# Patient Record
Sex: MALE | Race: WHITE | Employment: OTHER | URBAN - NONMETROPOLITAN AREA
[De-identification: names, ages, dates, MRNs, and addresses within clinical notes are randomized per-mention and may not be internally consistent; named-entity substitution may affect disease eponyms.]

---

## 2022-02-21 ENCOUNTER — APPOINTMENT (OUTPATIENT)
Dept: CT IMAGING | Age: 62
DRG: 300 | End: 2022-02-21
Payer: COMMERCIAL

## 2022-02-21 ENCOUNTER — TELEPHONE (OUTPATIENT)
Dept: VASCULAR SURGERY | Age: 62
End: 2022-02-21

## 2022-02-21 ENCOUNTER — HOSPITAL ENCOUNTER (INPATIENT)
Age: 62
LOS: 3 days | Discharge: ANOTHER HOSPITAL | DRG: 300 | End: 2022-02-24
Attending: EMERGENCY MEDICINE | Admitting: HOSPITALIST
Payer: COMMERCIAL

## 2022-02-21 DIAGNOSIS — I71.40 ABDOMINAL AORTIC ANEURYSM (AAA) WITHOUT RUPTURE: Primary | ICD-10-CM

## 2022-02-21 PROBLEM — I72.9 ANEURYSM (HCC): Status: ACTIVE | Noted: 2022-02-21

## 2022-02-21 LAB
ALBUMIN SERPL-MCNC: 2.6 G/DL (ref 3.5–5.2)
ALP BLD-CCNC: 151 U/L (ref 40–130)
ALT SERPL-CCNC: 19 U/L (ref 5–41)
ANION GAP SERPL CALCULATED.3IONS-SCNC: 12 MMOL/L (ref 7–19)
APTT: 40.8 SEC (ref 26–36.2)
AST SERPL-CCNC: 40 U/L (ref 5–40)
BASOPHILS ABSOLUTE: 0.1 K/UL (ref 0–0.2)
BASOPHILS RELATIVE PERCENT: 1 % (ref 0–1)
BILIRUB SERPL-MCNC: 1.2 MG/DL (ref 0.2–1.2)
BILIRUBIN URINE: NEGATIVE
BLOOD, URINE: NEGATIVE
BUN BLDV-MCNC: 14 MG/DL (ref 8–23)
CALCIUM SERPL-MCNC: 9.4 MG/DL (ref 8.8–10.2)
CHLORIDE BLD-SCNC: 105 MMOL/L (ref 98–111)
CLARITY: CLEAR
CO2: 22 MMOL/L (ref 22–29)
COLOR: ABNORMAL
CREAT SERPL-MCNC: 0.7 MG/DL (ref 0.5–1.2)
EOSINOPHILS ABSOLUTE: 0.1 K/UL (ref 0–0.6)
EOSINOPHILS RELATIVE PERCENT: 2.3 % (ref 0–5)
FERRITIN: 1756 NG/ML (ref 30–400)
FOLATE: 10.1 NG/ML (ref 4.5–32.2)
GFR AFRICAN AMERICAN: >59
GFR NON-AFRICAN AMERICAN: >60
GLUCOSE BLD-MCNC: 151 MG/DL (ref 74–109)
GLUCOSE URINE: NEGATIVE MG/DL
HCT VFR BLD CALC: 38.6 % (ref 42–52)
HEMOGLOBIN: 13 G/DL (ref 14–18)
IMMATURE GRANULOCYTES #: 0 K/UL
INR BLD: 1.38 (ref 0.88–1.18)
IRON SATURATION: 85 % (ref 14–50)
IRON: 114 UG/DL (ref 59–158)
KETONES, URINE: NEGATIVE MG/DL
LEUKOCYTE ESTERASE, URINE: NEGATIVE
LIPASE: 89 U/L (ref 13–60)
LYMPHOCYTES ABSOLUTE: 2 K/UL (ref 1.1–4.5)
LYMPHOCYTES RELATIVE PERCENT: 34.1 % (ref 20–40)
MAGNESIUM: 1.7 MG/DL (ref 1.6–2.4)
MCH RBC QN AUTO: 34.1 PG (ref 27–31)
MCHC RBC AUTO-ENTMCNC: 33.7 G/DL (ref 33–37)
MCV RBC AUTO: 101.3 FL (ref 80–94)
MONOCYTES ABSOLUTE: 0.9 K/UL (ref 0–0.9)
MONOCYTES RELATIVE PERCENT: 14.9 % (ref 0–10)
NEUTROPHILS ABSOLUTE: 2.8 K/UL (ref 1.5–7.5)
NEUTROPHILS RELATIVE PERCENT: 47.5 % (ref 50–65)
NITRITE, URINE: NEGATIVE
PDW BLD-RTO: 13.7 % (ref 11.5–14.5)
PH UA: 6 (ref 5–8)
PLATELET # BLD: 130 K/UL (ref 130–400)
PMV BLD AUTO: 10.7 FL (ref 9.4–12.4)
POTASSIUM SERPL-SCNC: 4.4 MMOL/L (ref 3.5–5)
PROTEIN UA: NEGATIVE MG/DL
PROTHROMBIN TIME: 17 SEC (ref 12–14.6)
RBC # BLD: 3.81 M/UL (ref 4.7–6.1)
SARS-COV-2, NAAT: NOT DETECTED
SODIUM BLD-SCNC: 139 MMOL/L (ref 136–145)
SPECIFIC GRAVITY UA: 1.02 (ref 1–1.03)
TOTAL IRON BINDING CAPACITY: 134 UG/DL (ref 250–400)
TOTAL PROTEIN: 8 G/DL (ref 6.6–8.7)
TSH SERPL DL<=0.05 MIU/L-ACNC: 2.29 UIU/ML (ref 0.27–4.2)
UROBILINOGEN, URINE: 1 E.U./DL
VITAMIN B-12: 978 PG/ML (ref 211–946)
VITAMIN D 25-HYDROXY: 32.5 NG/ML
WBC # BLD: 6 K/UL (ref 4.8–10.8)

## 2022-02-21 PROCEDURE — 6360000004 HC RX CONTRAST MEDICATION: Performed by: EMERGENCY MEDICINE

## 2022-02-21 PROCEDURE — 82746 ASSAY OF FOLIC ACID SERUM: CPT

## 2022-02-21 PROCEDURE — 82306 VITAMIN D 25 HYDROXY: CPT

## 2022-02-21 PROCEDURE — 87635 SARS-COV-2 COVID-19 AMP PRB: CPT

## 2022-02-21 PROCEDURE — 85730 THROMBOPLASTIN TIME PARTIAL: CPT

## 2022-02-21 PROCEDURE — 83540 ASSAY OF IRON: CPT

## 2022-02-21 PROCEDURE — 96374 THER/PROPH/DIAG INJ IV PUSH: CPT

## 2022-02-21 PROCEDURE — 84443 ASSAY THYROID STIM HORMONE: CPT

## 2022-02-21 PROCEDURE — 99284 EMERGENCY DEPT VISIT MOD MDM: CPT

## 2022-02-21 PROCEDURE — 6360000002 HC RX W HCPCS: Performed by: EMERGENCY MEDICINE

## 2022-02-21 PROCEDURE — 82607 VITAMIN B-12: CPT

## 2022-02-21 PROCEDURE — 6370000000 HC RX 637 (ALT 250 FOR IP): Performed by: HOSPITALIST

## 2022-02-21 PROCEDURE — 1210000000 HC MED SURG R&B

## 2022-02-21 PROCEDURE — 75635 CT ANGIO ABDOMINAL ARTERIES: CPT

## 2022-02-21 PROCEDURE — 83550 IRON BINDING TEST: CPT

## 2022-02-21 PROCEDURE — 80053 COMPREHEN METABOLIC PANEL: CPT

## 2022-02-21 PROCEDURE — 82728 ASSAY OF FERRITIN: CPT

## 2022-02-21 PROCEDURE — 36415 COLL VENOUS BLD VENIPUNCTURE: CPT

## 2022-02-21 PROCEDURE — 6360000002 HC RX W HCPCS: Performed by: HOSPITALIST

## 2022-02-21 PROCEDURE — 83735 ASSAY OF MAGNESIUM: CPT

## 2022-02-21 PROCEDURE — 2580000003 HC RX 258

## 2022-02-21 PROCEDURE — 85610 PROTHROMBIN TIME: CPT

## 2022-02-21 PROCEDURE — 96375 TX/PRO/DX INJ NEW DRUG ADDON: CPT

## 2022-02-21 PROCEDURE — 83690 ASSAY OF LIPASE: CPT

## 2022-02-21 PROCEDURE — 81003 URINALYSIS AUTO W/O SCOPE: CPT

## 2022-02-21 PROCEDURE — 85025 COMPLETE CBC W/AUTO DIFF WBC: CPT

## 2022-02-21 RX ORDER — MORPHINE SULFATE 4 MG/ML
4 INJECTION, SOLUTION INTRAMUSCULAR; INTRAVENOUS
Status: DISCONTINUED | OUTPATIENT
Start: 2022-02-21 | End: 2022-02-23

## 2022-02-21 RX ORDER — ACETAMINOPHEN 650 MG/1
650 SUPPOSITORY RECTAL EVERY 6 HOURS PRN
Status: DISCONTINUED | OUTPATIENT
Start: 2022-02-21 | End: 2022-02-25 | Stop reason: HOSPADM

## 2022-02-21 RX ORDER — LORAZEPAM 2 MG/ML
1 INJECTION INTRAMUSCULAR
Status: DISCONTINUED | OUTPATIENT
Start: 2022-02-21 | End: 2022-02-23

## 2022-02-21 RX ORDER — LORAZEPAM 1 MG/1
1 TABLET ORAL
Status: DISCONTINUED | OUTPATIENT
Start: 2022-02-21 | End: 2022-02-23

## 2022-02-21 RX ORDER — SODIUM CHLORIDE 0.9 % (FLUSH) 0.9 %
5-40 SYRINGE (ML) INJECTION PRN
Status: DISCONTINUED | OUTPATIENT
Start: 2022-02-21 | End: 2022-02-21 | Stop reason: SDUPTHER

## 2022-02-21 RX ORDER — SPIRONOLACTONE 100 MG/1
100 TABLET, FILM COATED ORAL 2 TIMES DAILY
Status: ON HOLD | COMMUNITY
End: 2022-02-23 | Stop reason: SDUPTHER

## 2022-02-21 RX ORDER — ONDANSETRON 4 MG/1
4 TABLET, ORALLY DISINTEGRATING ORAL EVERY 8 HOURS PRN
Status: DISCONTINUED | OUTPATIENT
Start: 2022-02-21 | End: 2022-02-25 | Stop reason: HOSPADM

## 2022-02-21 RX ORDER — SODIUM CHLORIDE 0.9 % (FLUSH) 0.9 %
5-40 SYRINGE (ML) INJECTION EVERY 12 HOURS SCHEDULED
Status: DISCONTINUED | OUTPATIENT
Start: 2022-02-21 | End: 2022-02-21 | Stop reason: SDUPTHER

## 2022-02-21 RX ORDER — POLYETHYLENE GLYCOL 3350 17 G/17G
17 POWDER, FOR SOLUTION ORAL DAILY PRN
Status: DISCONTINUED | OUTPATIENT
Start: 2022-02-21 | End: 2022-02-25 | Stop reason: HOSPADM

## 2022-02-21 RX ORDER — SODIUM CHLORIDE 0.9 % (FLUSH) 0.9 %
5-40 SYRINGE (ML) INJECTION EVERY 12 HOURS SCHEDULED
Status: DISCONTINUED | OUTPATIENT
Start: 2022-02-21 | End: 2022-02-25 | Stop reason: HOSPADM

## 2022-02-21 RX ORDER — SODIUM CHLORIDE 0.9 % (FLUSH) 0.9 %
5-40 SYRINGE (ML) INJECTION PRN
Status: DISCONTINUED | OUTPATIENT
Start: 2022-02-21 | End: 2022-02-25 | Stop reason: HOSPADM

## 2022-02-21 RX ORDER — ONDANSETRON 2 MG/ML
4 INJECTION INTRAMUSCULAR; INTRAVENOUS EVERY 6 HOURS PRN
Status: DISCONTINUED | OUTPATIENT
Start: 2022-02-21 | End: 2022-02-25 | Stop reason: HOSPADM

## 2022-02-21 RX ORDER — ACETAMINOPHEN 325 MG/1
650 TABLET ORAL EVERY 6 HOURS PRN
Status: DISCONTINUED | OUTPATIENT
Start: 2022-02-21 | End: 2022-02-25 | Stop reason: HOSPADM

## 2022-02-21 RX ORDER — HEPARIN SODIUM 10000 [USP'U]/100ML
5-30 INJECTION, SOLUTION INTRAVENOUS CONTINUOUS
Status: DISCONTINUED | OUTPATIENT
Start: 2022-02-21 | End: 2022-02-21

## 2022-02-21 RX ORDER — OMEPRAZOLE 40 MG/1
40 CAPSULE, DELAYED RELEASE ORAL DAILY
COMMUNITY

## 2022-02-21 RX ORDER — LORAZEPAM 1 MG/1
2 TABLET ORAL
Status: DISCONTINUED | OUTPATIENT
Start: 2022-02-21 | End: 2022-02-23

## 2022-02-21 RX ORDER — LORAZEPAM 2 MG/ML
3 INJECTION INTRAMUSCULAR
Status: DISCONTINUED | OUTPATIENT
Start: 2022-02-21 | End: 2022-02-23

## 2022-02-21 RX ORDER — LORAZEPAM 2 MG/ML
4 INJECTION INTRAMUSCULAR
Status: DISCONTINUED | OUTPATIENT
Start: 2022-02-21 | End: 2022-02-23

## 2022-02-21 RX ORDER — FUROSEMIDE 10 MG/ML
40 INJECTION INTRAMUSCULAR; INTRAVENOUS DAILY
Status: DISCONTINUED | OUTPATIENT
Start: 2022-02-21 | End: 2022-02-23

## 2022-02-21 RX ORDER — BISOPROLOL FUMARATE AND HYDROCHLOROTHIAZIDE 5; 6.25 MG/1; MG/1
1 TABLET ORAL DAILY
COMMUNITY

## 2022-02-21 RX ORDER — HEPARIN SODIUM 1000 [USP'U]/ML
40 INJECTION, SOLUTION INTRAVENOUS; SUBCUTANEOUS PRN
Status: DISCONTINUED | OUTPATIENT
Start: 2022-02-21 | End: 2022-02-21

## 2022-02-21 RX ORDER — HEPARIN SODIUM 1000 [USP'U]/ML
80 INJECTION, SOLUTION INTRAVENOUS; SUBCUTANEOUS ONCE
Status: DISCONTINUED | OUTPATIENT
Start: 2022-02-21 | End: 2022-02-21

## 2022-02-21 RX ORDER — SODIUM CHLORIDE 9 MG/ML
25 INJECTION, SOLUTION INTRAVENOUS PRN
Status: DISCONTINUED | OUTPATIENT
Start: 2022-02-21 | End: 2022-02-21 | Stop reason: SDUPTHER

## 2022-02-21 RX ORDER — HYDRALAZINE HYDROCHLORIDE 20 MG/ML
5 INJECTION INTRAMUSCULAR; INTRAVENOUS EVERY 4 HOURS PRN
Status: DISCONTINUED | OUTPATIENT
Start: 2022-02-21 | End: 2022-02-25 | Stop reason: HOSPADM

## 2022-02-21 RX ORDER — LORAZEPAM 1 MG/1
4 TABLET ORAL
Status: DISCONTINUED | OUTPATIENT
Start: 2022-02-21 | End: 2022-02-23

## 2022-02-21 RX ORDER — MORPHINE SULFATE 4 MG/ML
4 INJECTION, SOLUTION INTRAMUSCULAR; INTRAVENOUS ONCE
Status: COMPLETED | OUTPATIENT
Start: 2022-02-21 | End: 2022-02-21

## 2022-02-21 RX ORDER — HEPARIN SODIUM 1000 [USP'U]/ML
80 INJECTION, SOLUTION INTRAVENOUS; SUBCUTANEOUS PRN
Status: DISCONTINUED | OUTPATIENT
Start: 2022-02-21 | End: 2022-02-21

## 2022-02-21 RX ORDER — LORAZEPAM 1 MG/1
3 TABLET ORAL
Status: DISCONTINUED | OUTPATIENT
Start: 2022-02-21 | End: 2022-02-23

## 2022-02-21 RX ORDER — LORAZEPAM 2 MG/ML
2 INJECTION INTRAMUSCULAR
Status: DISCONTINUED | OUTPATIENT
Start: 2022-02-21 | End: 2022-02-23

## 2022-02-21 RX ORDER — SODIUM CHLORIDE 9 MG/ML
25 INJECTION, SOLUTION INTRAVENOUS PRN
Status: DISCONTINUED | OUTPATIENT
Start: 2022-02-21 | End: 2022-02-25 | Stop reason: HOSPADM

## 2022-02-21 RX ORDER — HYDROXYZINE 50 MG/1
50 TABLET, FILM COATED ORAL NIGHTLY PRN
COMMUNITY

## 2022-02-21 RX ORDER — MORPHINE SULFATE 4 MG/ML
4 INJECTION, SOLUTION INTRAMUSCULAR; INTRAVENOUS
Status: DISCONTINUED | OUTPATIENT
Start: 2022-02-21 | End: 2022-02-21 | Stop reason: SDUPTHER

## 2022-02-21 RX ORDER — ONDANSETRON 2 MG/ML
4 INJECTION INTRAMUSCULAR; INTRAVENOUS ONCE
Status: COMPLETED | OUTPATIENT
Start: 2022-02-21 | End: 2022-02-21

## 2022-02-21 RX ORDER — SPIRONOLACTONE 25 MG/1
25 TABLET ORAL 2 TIMES DAILY
Status: DISCONTINUED | OUTPATIENT
Start: 2022-02-21 | End: 2022-02-23

## 2022-02-21 RX ORDER — PANTOPRAZOLE SODIUM 40 MG/1
40 TABLET, DELAYED RELEASE ORAL
Status: DISCONTINUED | OUTPATIENT
Start: 2022-02-22 | End: 2022-02-25 | Stop reason: HOSPADM

## 2022-02-21 RX ADMIN — SODIUM CHLORIDE, PRESERVATIVE FREE 10 ML: 5 INJECTION INTRAVENOUS at 21:13

## 2022-02-21 RX ADMIN — SPIRONOLACTONE 25 MG: 25 TABLET ORAL at 21:13

## 2022-02-21 RX ADMIN — FUROSEMIDE 40 MG: 10 INJECTION, SOLUTION INTRAMUSCULAR; INTRAVENOUS at 21:13

## 2022-02-21 RX ADMIN — MORPHINE SULFATE 4 MG: 4 INJECTION INTRAVENOUS at 14:29

## 2022-02-21 RX ADMIN — IOPAMIDOL 90 ML: 755 INJECTION, SOLUTION INTRAVENOUS at 15:20

## 2022-02-21 RX ADMIN — ONDANSETRON 4 MG: 2 INJECTION INTRAMUSCULAR; INTRAVENOUS at 14:29

## 2022-02-21 ASSESSMENT — ENCOUNTER SYMPTOMS
SHORTNESS OF BREATH: 0
CONSTIPATION: 0
BACK PAIN: 0
VOMITING: 0
WHEEZING: 0
CHEST TIGHTNESS: 0
COUGH: 0
ABDOMINAL DISTENTION: 1
ABDOMINAL PAIN: 1
RHINORRHEA: 0
SORE THROAT: 0
DIARRHEA: 0
COLOR CHANGE: 0
NAUSEA: 0

## 2022-02-21 ASSESSMENT — PAIN SCALES - GENERAL
PAINLEVEL_OUTOF10: 6
PAINLEVEL_OUTOF10: 0
PAINLEVEL_OUTOF10: 6

## 2022-02-21 NOTE — ED NOTES
Orders from Dr. Reginaldo Pride to hold heparin at this time after talking to vascular.  Heparin not started     Jamila Davidson RN  02/21/22 1859

## 2022-02-21 NOTE — ED PROVIDER NOTES
[facility] EMERGENCY DEPT  eMERGENCY dEPARTMENT eNCOUnter      Pt Name: Kelly Allen  MRN: [id number]  [place] 1960  Date of evaluation: 2/21/2022  Provider: Elyse Kaplan MD    CHIEF COMPLAINT     No chief complaint on file. HISTORY OF PRESENT ILLNESS   (Location/Symptom, Timing/Onset,Context/Setting, Quality, Duration, Modifying Factors, Severity)  Note limiting factors. Kelly Allen is a 64 y.o. male who presents to the emergency department with abdominal pain. Patient states that he has been having abdominal pain and saw a gastroenterologist. Had colonoscopy which was normal. He had ultrasound done of his abdomen which showed an abdominal aneurysm measuring 5.9 cm. In addition, he was diagnosed with cirrhosis. Was drinking 15 beers daily, last drink was 3 weeks ago. He was referred to a vascular surgeon in [address] [place] [address] for the aneurysm. He saw the vascular surgeon today who stated the aneurysm was 6 cm. He was having difficulty finding a pulse in one of his legs and told them that they needed to get a CT scan. The family was concerned because they were not told when he needed to get a CT scan or what to do from there. They called the primary care office and were told to go to the ER since he has been having abdominal pain. HPI    NursingNotes were reviewed. REVIEW OF SYSTEMS    (2-9 systems for level 4, 10 or more for level 5)     Review of Systems   Constitutional: Negative for chills and fever. HENT: Negative for rhinorrhea and sore throat. Respiratory: Negative for shortness of breath. Cardiovascular: Negative for chest pain and leg swelling. Gastrointestinal: Positive for abdominal pain. Negative for diarrhea, nausea and vomiting. Genitourinary: Negative for difficulty urinating. Musculoskeletal: Negative for back pain and neck pain. Skin: Negative for rash. Neurological: Negative for weakness and headaches. Psychiatric/Behavioral: Negative for confusion.        A complete review of systems was performed and is negative except as noted above in the HPI. PAST MEDICAL HISTORY   No past medical history on file. SURGICAL HISTORY     No past surgical history on file. CURRENT MEDICATIONS       Previous Medications    No medications on file       ALLERGIES     Patient has no known allergies. FAMILY HISTORY     No family history on file. SOCIAL HISTORY       Social History     Socioeconomic History    Marital status:      Spouse name: Not on file    Number of children: Not on file    Years of education: Not on file    Highest education level: Not on file   Occupational History    Not on file   Tobacco Use    Smoking status: Not on file    Smokeless tobacco: Not on file   Substance and Sexual Activity    Alcohol use: Not on file    Drug use: Not on file    Sexual activity: Not on file   Other Topics Concern    Not on file   Social History Narrative    Not on file     Social Determinants of Health     Financial Resource Strain:     Difficulty of Paying Living Expenses: Not on file   Food Insecurity:     Worried About Running Out of Food in the Last Year: Not on file    Sonny of Food in the Last Year: Not on file   Transportation Needs:     Lack of Transportation (Medical): Not on file    Lack of Transportation (Non-Medical):  Not on file   Physical Activity:     Days of Exercise per Week: Not on file    Minutes of Exercise per Session: Not on file   Stress:     Feeling of Stress : Not on file   Social Connections:     Frequency of Communication with Friends and Family: Not on file    Frequency of Social Gatherings with Friends and Family: Not on file    Attends Taoism Services: Not on file    Active Member of Clubs or Organizations: Not on file    Attends Club or Organization Meetings: Not on file    Marital Status: Not on file   Intimate Partner Violence:     Fear of Current or Ex-Partner: Not on file   [employer] Abused: Not on file    Physically Abused: Not on file    Sexually Abused: Not on file   Housing Stability:     Unable to Pay for Housing in the Last Year: Not on file    Number of [place] in the Last Year: Not on file    Unstable Housing in the Last Year: Not on file       SCREENINGS    Floyd Coma Scale  Eye Opening: Spontaneous  Best Verbal Response: Oriented  Best Motor Response: Obeys commands  Floyd Coma Scale Score: 15        PHYSICAL EXAM    (up to 7 for level 4, 8 or more for level 5)     ED Triage Vitals [02/21/22 1355]   BP Temp Temp src Pulse Resp SpO2 Height Weight   (!) 145/74 98.1 °F (36.7 °C) -- 75 18 96 % 5' 7\" (1.702 m) 180 lb (81.6 kg)       Physical Exam  Vitals and nursing note reviewed. Constitutional:       General: He is not in acute distress. Appearance: He is well-developed. He is not diaphoretic. HENT:      Head: Normocephalic and atraumatic. Eyes:      Pupils: Pupils are equal, round, and reactive to light. Cardiovascular:      Rate and Rhythm: Normal rate and regular rhythm. Heart sounds: Normal heart sounds. Comments: Right foot is warm he does have diminished pulses in his right foot however they were able to be found with the Doppler. Normal pulse left lower extremity  Pulmonary:      Effort: Pulmonary effort is normal. No respiratory distress. Breath sounds: Normal breath sounds. Abdominal:      General: Bowel sounds are normal. There is no distension. Palpations: Abdomen is soft. Tenderness: There is abdominal tenderness. Musculoskeletal:         General: Normal range of motion. Cervical back: Normal range of motion and neck supple. Skin:     General: Skin is warm and dry. Findings: No rash. Neurological:      Mental Status: He is alert and oriented to person, place, and time. Cranial Nerves: No cranial nerve deficit. Motor: No abnormal muscle tone.       Coordination: Coordination normal.   Psychiatric: Behavior: Behavior normal.         DIAGNOSTIC RESULTS     EKG: All EKG's are interpreted by the Emergency Department Physician who either signs or Co-signs this chart in the absence of a cardiologist.        RADIOLOGY:   Non-plain film images such as CT, Ultrasound and MRI are read by the radiologist. Plainradiographic images are visualized and preliminarily interpreted by the emergency physician with the below findings:        Interpretation per the Radiologist below, if available at the time of this note:    CTA ABDOMINAL AORTA W BILAT RUNOFF W CONTRAST   Final Result   1. The severe atheromatous changes of the abdominal aorta and iliac   and femoral arteries. 2. Fusiform aneurysmal dilatation of the distal abdominal aorta which   measure 6.7 x 6.2 cm. Partial lumen mural thrombosis. The details is   given above. 3. Complete occlusion of the right proximal common iliac artery. The   re-opacification of the distal right common iliac artery adjacent and   above the bifurcation from the inferior epigastric and circumflex   iliac collateral circulation. 4. There are small and attenuated three-vessel runoff bilaterally. 5. Moderate abdominal and pelvic ascites. 6. Hepatic cirrhosis a moderate splenomegaly. 7. A mild mesenteric adenopathy. The above findings were reported to ER physician, Dr. Yumiko Dwyer,   immediately.    Signed by Dr Sandra Hale            ED BEDSIDE ULTRASOUND:   Performed by ED Physician - none    LABS:  Labs Reviewed   APTT - Abnormal; Notable for the following components:       Result Value    aPTT 40.8 (*)     All other components within normal limits   CBC WITH AUTO DIFFERENTIAL - Abnormal; Notable for the following components:    RBC 3.81 (*)     Hemoglobin 13.0 (*)     Hematocrit 38.6 (*)     .3 (*)     MCH 34.1 (*)     Neutrophils % 47.5 (*)     Monocytes % 14.9 (*)     All other components within normal limits   COMPREHENSIVE METABOLIC PANEL - Abnormal; Notable for the following components:    Glucose 151 (*)     Albumin 2.6 (*)     Alkaline Phosphatase 151 (*)     All other components within normal limits   LIPASE - Abnormal; Notable for the following components:    Lipase 89 (*)     All other components within normal limits   PROTIME-INR - Abnormal; Notable for the following components:    Protime 17.0 (*)     INR 1.38 (*)     All other components within normal limits   URINALYSIS WITH REFLEX TO CULTURE - Abnormal; Notable for the following components:    Color, UA DARK YELLOW (*)     All other components within normal limits   COVID-19, RAPID   CBC   APTT   APTT   APTT       All other labs were within normal range or not returned as of this dictation. EMERGENCY DEPARTMENT COURSE and DIFFERENTIALDIAGNOSIS/MDM:   Vitals:    Vitals:    02/21/22 1355 02/21/22 1418 02/21/22 1431 02/21/22 1502   BP: (!) 145/74  (!) 149/68 132/70   Pulse: 75 72 73 72   Resp: 18  15 13   Temp: 98.1 °F (36.7 °C)      SpO2: 96%  95% 92%   Weight: 180 lb (81.6 kg)      Height: 5' 7\" (1.702 m)          MDM  D/w Dr Alix Dumont. She states the clot is probably chronic. No heparin gtt since he has a pulse. Doesn't need to be NPO at midnight. She will see him tomorrow, eval the CT and d/w pt and family the surgical options. D/w hospitalist for admission. CONSULTS:  IP CONSULT TO VASCULAR SURGERY    PROCEDURES:  Unless otherwise notedbelow, none     Procedures    FINAL IMPRESSION     1.  Abdominal aortic aneurysm (AAA) without rupture ([facility] [address]          DISPOSITION/PLAN   DISPOSITION        PATIENT REFERRED TO:  @FUP@    DISCHARGE MEDICATIONS:  New Prescriptions    No medications on file          (Please note that portions of this note were completed with a voice recognition program.  Efforts were made to edit the dictations butoccasionally words are mis-transcribed.)    Mandie Lindsey MD (electronically signed)  AttendingEmergency Physician         Mandie Lindsey MD  02/21/22  John Mesa MD  02/21/22 1528

## 2022-02-21 NOTE — H&P
[facility] - History & Physical      PCP: ESTHER Guadalupe CNP    Date of Admission: 2/21/2022    Date of Service: 2/21/2022    Chief Complaint:  Abdominal pain     History Of Present Illness: The patient is a 64 y.o. male who presented to [facility] ER with PMH smoker, alcohol abuse, and cirrhosis complaining of worsening abdominal pain. Patient states that he has been having abdominal pain ongoing for the past two months, has seen a gastroenterologist and had a colonoscopy that was normal. He had ultrasound done of his abdomen which showed an abdominal aneurysm measuring 5.9 cm. In addition, he was diagnosed with cirrhosis. Was drinking 15 beers daily, last drink was 3 weeks ago. He was referred to a vascular surgeon in [facility] for the aneurysm. States that he saw the vascular surgeon today and told him that he needed to have further testing, CT scan. However patient and spouse state that  did not explain further studies or plan of care, so family called PCP office and were instructed to come to [facility] ER. Work-up in ER CTA abdomen fusiform aneurysmal dilatation of the distal abdominal aorta 6.7 x 6.2cm. Partial lumen mural thrombosis. Plate occlusion of the right proximal common iliac artery. Moderate abdominal and pelvic ascites, hepatic cirrhosis, moderate splenomegaly. Received morphine 4 mg IV and Zofran 4 mg IV. Physician discussed this case with Dr. Jim Villarreal. States that the clot is probably chronic, no heparin drip warranted since pulse was dopplered. Vascular surgeon will evaluate patient in a.m. Patient is to be admitted to the hospitalist service due to abdominal aortic aneurysm with consultation to vascular surgery. Past Medical History:        Diagnosis Date    Alcohol abuse     Alcoholic cirrhosis, unspecified whether ascites present ([facility] [address]     Smoker        Past Surgical History:    No past surgical history on file.     Home Medications:  Prior to Admission medications    Not on File       Allergies:    Patient has no known allergies. Social History:    The patient currently lives home with spouse  Tobacco:   has no history on file for tobacco use. Alcohol:   has no history on file for alcohol use. Illicit Drugs: denies    Family History:  No family history on file. Review of Systems:   Review of Systems   Constitutional: Positive for appetite change and fatigue. Negative for chills, diaphoresis and fever. Respiratory: Negative for cough, chest tightness, shortness of breath and wheezing. Cardiovascular: Negative for chest pain and palpitations. Gastrointestinal: Positive for abdominal distention and abdominal pain. Negative for constipation, nausea and vomiting. Skin: Negative for color change, pallor and rash. Neurological: Positive for weakness. Negative for tremors, syncope, light-headedness, numbness and headaches. Psychiatric/Behavioral: Negative for agitation, behavioral problems and confusion. 14 point review of systems is negative except as specifically addressed above. Physical Examination:  /70   Pulse 72   Temp 98.1 °F (36.7 °C)   Resp 13   Ht 5' 7\" (1.702 m)   Wt 180 lb (81.6 kg)   SpO2 92%   BMI 28.19 kg/m²   Physical Exam  Vitals and nursing note reviewed. Constitutional:       Appearance: Normal appearance. HENT:      Mouth/Throat:      Mouth: Mucous membranes are moist.      Pharynx: Oropharynx is clear. Eyes:      Extraocular Movements: Extraocular movements intact. Conjunctiva/sclera: Conjunctivae normal.      Pupils: Pupils are equal, round, and reactive to light. Cardiovascular:      Rate and Rhythm: Normal rate and regular rhythm. Pulses: Normal pulses. Heart sounds: Normal heart sounds. No murmur heard. Comments: Right foot is warm, diminished pulses right foot however present with Doppler.   Normal pulse, +2  left lower extremity  Pulmonary:      Effort: Pulmonary effort is normal. No respiratory distress. Breath sounds: Normal breath sounds. Abdominal:      General: Bowel sounds are normal. There is no distension. Palpations: Abdomen is soft. Tenderness: There is generalized abdominal tenderness. There is no guarding or rebound. Musculoskeletal:         General: No swelling. Normal range of motion. Cervical back: Normal range of motion and neck supple. No rigidity or tenderness. Right lower leg: No edema. Left lower leg: No edema. Skin:     General: Skin is warm and dry. Capillary Refill: Capillary refill takes less than 2 seconds. Comments: Psoriasis noted   Neurological:      General: No focal deficit present. Mental Status: He is alert and oriented to person, place, and time. Cranial Nerves: No cranial nerve deficit. Psychiatric:         Mood and Affect: Mood normal.         Behavior: Behavior normal.          Diagnostic Data:  CBC:  Recent Labs     02/21/22  1424   WBC 6.0   HGB 13.0*   HCT 38.6*        BMP:  Recent Labs     02/21/22  1424      K 4.4      CO2 22   BUN 14   CREATININE 0.7   CALCIUM 9.4     Recent Labs     02/21/22  1424   AST 40   ALT 19   BILITOT 1.2   ALKPHOS 151*     Coag Panel:   Recent Labs     02/21/22  1430   INR 1.38*   PROTIME 17.0*   APTT 40.8*     Urinalysis:  Lab Results   Component Value Date    NITRU Negative 02/21/2022    BLOODU Negative 02/21/2022    SPECGRAV 1.019 02/21/2022    GLUCOSEU Negative 02/21/2022       CTA ABDOMINAL AORTA W BILAT RUNOFF W CONTRAST    Result Date: 2/21/2022  Examination. CTA ABDOMINAL AORTA W BILAT RUNOFF W CONTRAST 2/21/2022 3:19 PM History: Decreased pulse in the right lower extremity. History of aneurysm. DLP: 4243 mGycm. The CT angiography of the abdomen, pelvis and lower extremity is performed before and after intravenous contrast enhancement.  The images are acquired in axial plane and subsequent 2-D reconstruction in coronal and sagittal planes and 3-D maximum intensity projection reconstruction. There is no previous study for comparison. There is moderate amount of abdominal and pelvic ascites. There are severe atheromatous changes of the abdominal aorta and femoral arteries. There is aneurysmal dilatation of the distal abdominal aorta below the origin of the renal arteries. The maximum lumen diameter of the abdominal aorta measures 6.2 x 6.7 cm. There is partial lumen mural thrombosis and effective lumen measuring 4.5 cm proximally and 0.4 cm distally before bifurcation. Large atheromatous plaque is seen at the origin of the celiac trunk with moderate, 50-69% stenosis. There is a moderate poststenotic dilatation. The left gastric artery arises separately from the aorta adjacent and to the left of the origin of celiac trunk. There is a circumferential calcific plaque at the origin of the superior mesenteric artery with 50% stenosis. The remaining artery is unremarkable. Large calcific plaques are seen at the origin of both renal arteries. No significant stenosis. Origin of the inferior mesenteric artery is not visualized. There are heavy calcific plaques in the distal abdominal aorta at the level of bifurcation extending into both common iliac arteries. There is severe stenosis and subsequent complete occlusion of the right common iliac artery. There is a severe, more than 70%, stenosis of the proximal left common iliac artery. There is opacification of the distal right common iliac artery just before bifurcation. This is probably from collateral circulation of the right inferior epigastric and the right circumflex iliac arteries and subsequent reflux into the right external iliac artery. Heavy atheromatous right external and internal iliac arteries are seen. Large calcific plaque is seen in the right common femoral artery with 50% stenosis. Heavily atheromatous left internal/external iliac arteries are seen.  Normal size left external lumen mural thrombosis. The details is given above. 3. Complete occlusion of the right proximal common iliac artery. The re-opacification of the distal right common iliac artery adjacent and above the bifurcation from the inferior epigastric and circumflex iliac collateral circulation. 4. There are small and attenuated three-vessel runoff bilaterally. 5. Moderate abdominal and pelvic ascites. 6. Hepatic cirrhosis a moderate splenomegaly. 7. A mild mesenteric adenopathy. The above findings were reported to ER physician, Dr. Paulette Cuenca, immediately. Signed by Dr Dianne Lester    Assessment/Plan:  Abdominal aortic aneurysm    -CTA abdomen   -Consultation to vascular surgery   -As needed pain medication   -Neurovascular checks every 4 hours   History of alcohol abuse                - Monitor for withdrawal               - CIWA protocol in place   - Lorazepam PRN per CIWA protocol               - Counseled on alcohol abuse    -Seizure precautions              - Social work consultation for rehab/placemnet              - Denies SI/HI    DVT prophylaxis Lovenox    Signed:  ESTHER Marquez - CNP, 2/21/2022 5:57 PM        Attestation Statement     I have independently seen and examined this patient and agree with the asesment and plan by mid level provider                                                      Objective:   Vitals: /73   Pulse 76   Temp 97 °F (36.1 °C)   Resp 20   Ht 5' 7\" (1.702 m)   Wt 166 lb 11.2 oz (75.6 kg)   SpO2 97%   BMI 26.11 kg/m²   General appearance: alert, appears stated age and cooperative  Skin: Skin color, texture, turgor normal.   HEENT: Head: Normocephalic, no lesions, without obvious abnormality.   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

## 2022-02-21 NOTE — ED NOTES
Bed: 08  Expected date:   Expected time:   Means of arrival:   Comments:     Mynor Toth RN  02/21/22 1400

## 2022-02-21 NOTE — TELEPHONE ENCOUNTER
Spoke with Mrs. Vilchis to schedule an apt from the referral we received from [facility] @ 531/[address]. The patient's wife stated that she had him in the ER now. I told her that we were calling to schedule an apt for Thursday and if is dismissed to call to schedule the apt.

## 2022-02-21 NOTE — ED NOTES
Called Dr. Jose Sethi office @ [phone] spoke with the office and they paged Dr. Jinny Ricci  02/21/22 [phone]

## 2022-02-21 NOTE — TELEPHONE ENCOUNTER
Called to schedule an apt from the referral from [facility]. The patient's wife explained that she had patient in the ER. She would call to follow up with an apt. Apt needs to be scheduled on a Thursday afternoon with Dr. Anna Martel is in the office.

## 2022-02-22 ENCOUNTER — TELEPHONE (OUTPATIENT)
Dept: VASCULAR SURGERY | Age: 62
End: 2022-02-22

## 2022-02-22 PROBLEM — I71.40 AAA (ABDOMINAL AORTIC ANEURYSM) WITHOUT RUPTURE: Status: ACTIVE | Noted: 2022-02-22

## 2022-02-22 PROBLEM — E78.2 MIXED HYPERLIPIDEMIA: Status: ACTIVE | Noted: 2022-02-22

## 2022-02-22 PROBLEM — I10 PRIMARY HYPERTENSION: Status: ACTIVE | Noted: 2022-02-22

## 2022-02-22 PROBLEM — L40.9 PSORIASIS: Status: ACTIVE | Noted: 2022-02-22

## 2022-02-22 LAB
ALBUMIN SERPL-MCNC: 2.5 G/DL (ref 3.5–5.2)
ALP BLD-CCNC: 150 U/L (ref 40–130)
ALT SERPL-CCNC: 19 U/L (ref 5–41)
ANION GAP SERPL CALCULATED.3IONS-SCNC: 11 MMOL/L (ref 7–19)
AST SERPL-CCNC: 37 U/L (ref 5–40)
BILIRUB SERPL-MCNC: 0.9 MG/DL (ref 0.2–1.2)
BUN BLDV-MCNC: 15 MG/DL (ref 8–23)
CALCIUM SERPL-MCNC: 9.3 MG/DL (ref 8.8–10.2)
CHLORIDE BLD-SCNC: 105 MMOL/L (ref 98–111)
CHOLESTEROL, TOTAL: 129 MG/DL (ref 160–199)
CO2: 22 MMOL/L (ref 22–29)
CREAT SERPL-MCNC: 0.8 MG/DL (ref 0.5–1.2)
GFR AFRICAN AMERICAN: >59
GFR NON-AFRICAN AMERICAN: >60
GLUCOSE BLD-MCNC: 99 MG/DL (ref 74–109)
HCT VFR BLD CALC: 37.8 % (ref 42–52)
HDLC SERPL-MCNC: 41 MG/DL (ref 55–121)
HEMOGLOBIN: 12.6 G/DL (ref 14–18)
LDL CHOLESTEROL CALCULATED: 70 MG/DL
LIPASE: 73 U/L (ref 13–60)
MCH RBC QN AUTO: 34 PG (ref 27–31)
MCHC RBC AUTO-ENTMCNC: 33.3 G/DL (ref 33–37)
MCV RBC AUTO: 101.9 FL (ref 80–94)
PDW BLD-RTO: 13.7 % (ref 11.5–14.5)
PLATELET # BLD: 122 K/UL (ref 130–400)
PMV BLD AUTO: 10.5 FL (ref 9.4–12.4)
POTASSIUM REFLEX MAGNESIUM: 4.1 MMOL/L (ref 3.5–5)
RBC # BLD: 3.71 M/UL (ref 4.7–6.1)
SODIUM BLD-SCNC: 138 MMOL/L (ref 136–145)
TOTAL PROTEIN: 7.3 G/DL (ref 6.6–8.7)
TRIGL SERPL-MCNC: 91 MG/DL (ref 0–149)
WBC # BLD: 6.2 K/UL (ref 4.8–10.8)

## 2022-02-22 PROCEDURE — 85027 COMPLETE CBC AUTOMATED: CPT

## 2022-02-22 PROCEDURE — 6370000000 HC RX 637 (ALT 250 FOR IP): Performed by: HOSPITALIST

## 2022-02-22 PROCEDURE — 36415 COLL VENOUS BLD VENIPUNCTURE: CPT

## 2022-02-22 PROCEDURE — 6360000002 HC RX W HCPCS: Performed by: HOSPITALIST

## 2022-02-22 PROCEDURE — 83690 ASSAY OF LIPASE: CPT

## 2022-02-22 PROCEDURE — 99222 1ST HOSP IP/OBS MODERATE 55: CPT | Performed by: INTERNAL MEDICINE

## 2022-02-22 PROCEDURE — 1210000000 HC MED SURG R&B

## 2022-02-22 PROCEDURE — 80061 LIPID PANEL: CPT

## 2022-02-22 PROCEDURE — 2580000003 HC RX 258

## 2022-02-22 PROCEDURE — 80053 COMPREHEN METABOLIC PANEL: CPT

## 2022-02-22 PROCEDURE — 99222 1ST HOSP IP/OBS MODERATE 55: CPT | Performed by: NURSE PRACTITIONER

## 2022-02-22 RX ADMIN — SPIRONOLACTONE 25 MG: 25 TABLET ORAL at 08:56

## 2022-02-22 RX ADMIN — SODIUM CHLORIDE, PRESERVATIVE FREE 10 ML: 5 INJECTION INTRAVENOUS at 09:10

## 2022-02-22 RX ADMIN — SPIRONOLACTONE 25 MG: 25 TABLET ORAL at 17:03

## 2022-02-22 RX ADMIN — SODIUM CHLORIDE, PRESERVATIVE FREE 10 ML: 5 INJECTION INTRAVENOUS at 21:33

## 2022-02-22 RX ADMIN — PANTOPRAZOLE SODIUM 40 MG: 40 TABLET, DELAYED RELEASE ORAL at 05:44

## 2022-02-22 RX ADMIN — FUROSEMIDE 40 MG: 10 INJECTION, SOLUTION INTRAMUSCULAR; INTRAVENOUS at 08:56

## 2022-02-22 ASSESSMENT — PAIN SCALES - GENERAL
PAINLEVEL_OUTOF10: 0
PAINLEVEL_OUTOF10: 0

## 2022-02-22 NOTE — PROGRESS NOTES
HOSPITAL MEDICINE  - PROGRESS NOTE    Admit Date: 2/21/2022         CC: abd pain     Subjective: no abd pain, no N/V, no D/C, no dyspnea, no chest pain, no back pain, no edema, no fever or chills    Objective: no distress, 63 yo male with PMH of alcohol abuse prsesnted with abd pain, CT showed AAA and ascites, evaluated by VS who stated patient is not a candidate for EVAR and is not a suitable candidate for open AAA. Recommended transfer to tertiary care center for AAA repair. evaluated by GI for new onset ascites who recommend lasix and spironolactone on discharge      Diet: ADULT DIET;  Regular; Low Fat/Low Chol/High Fiber/DEMOND; Low Sodium (2 gm)  Pain is:None  Nausea:None  Bowel Movement/Flatus yes    Data:   Scheduled Meds: Reviewed  Current Facility-Administered Medications   Medication Dose Route Frequency Provider Last Rate Last Admin    enoxaparin (LOVENOX) injection 40 mg  40 mg SubCUTAneous Daily Nazario Carter MD        ondansetron (ZOFRAN-ODT) disintegrating tablet 4 mg  4 mg Oral Q8H PRN Nazario Carter MD        Or    ondansetron [facility]) injection 4 mg  4 mg IntraVENous Q6H PRN Nazario Carter MD        polyethylene glycol (GLYCOLAX) packet 17 g  17 g Oral Daily PRN Nazario Carter MD        acetaminophen (TYLENOL) tablet 650 mg  650 mg Oral Q6H PRN Nazario Carter MD        Or    acetaminophen (TYLENOL) suppository 650 mg  650 mg Rectal Q6H PRN Nazario Carter MD        hydrALAZINE (APRESOLINE) injection 5 mg  5 mg IntraVENous Q4H PRN Nazario Carter MD        furosemide (LASIX) injection 40 mg  40 mg IntraVENous Daily Nazario Cartre MD   40 mg at 02/22/22 6944    spironolactone (ALDACTONE) tablet 25 mg  25 mg Oral BID Nazario Carter MD   25 mg at 02/22/22 0856    pantoprazole (PROTONIX) tablet 40 mg  40 mg Oral QAM AC Nazario Carter MD   40 mg at 02/22/22 0544    sodium chloride flush 0.9 % injection 5-40 mL  5-40 mL IntraVENous 2 times per day Sharron Riser, APRN - CNP   10 mL at 02/22/22 0910    sodium chloride flush 0.9 % injection 5-40 mL  5-40 mL IntraVENous PRN Sharron Riser, APRN - CNP        0.9 % sodium chloride infusion  25 mL IntraVENous PRN Sharron Riser, APRN - CNP        LORazepam (ATIVAN) tablet 1 mg  1 mg Oral Q1H PRN Sharron Riser, APRN - CNP        Or    LORazepam (ATIVAN) injection 1 mg  1 mg IntraVENous Q1H PRN Sharron Riser, APRN - CNP        Or    LORazepam (ATIVAN) tablet 2 mg  2 mg Oral Q1H PRN Sharron Riser, APRN - CNP        Or    LORazepam (ATIVAN) injection 2 mg  2 mg IntraVENous Q1H PRN Sharron Riser, APRN - CNP        Or    LORazepam (ATIVAN) tablet 3 mg  3 mg Oral Q1H PRN Sharron Riser, APRN - CNP        Or    LORazepam (ATIVAN) injection 3 mg  3 mg IntraVENous Q1H PRN Sharron Riser, APRN - CNP        Or    LORazepam (ATIVAN) tablet 4 mg  4 mg Oral Q1H PRN Sharron Riser, APRN - CNP        Or    LORazepam (ATIVAN) injection 4 mg  4 mg IntraVENous Q1H PRN Sharron Riser, APRN - CNP        morphine injection 4 mg  4 mg IntraVENous Q3H PRN Javon Lara MD         DVT Prophylaxis: Lovenox 40 mg sq daily    Continuous Infusions:   sodium chloride         Intake/Output Summary (Last 24 hours) at 2/22/2022 1551  Last data filed at 2/22/2022 1326  Gross per 24 hour   Intake 210 ml   Output 1975 ml   Net -1765 ml     CBC:   Recent Labs     02/21/22  1424 02/22/22  0325   WBC 6.0 6.2   HGB 13.0* 12.6*    122*     BMP:  Recent Labs     02/21/22  1424 02/22/22  0325    138   K 4.4 4.1    105   CO2 22 22   BUN 14 15   CREATININE 0.7 0.8   GLUCOSE 151* 99     ABGs: No results found for: PHART, PO2ART, RFG1WBN  INR:   Recent Labs     02/21/22  1430   INR 1.38*         Objective:   Vitals: /72   Pulse 84   Temp 98.4 °F (36.9 °C)   Resp 16   Ht 5' 7\" (1.702 m)   Wt 170 lb 8 oz (77.3 kg) SpO2 94%   BMI 26.70 kg/m²   General appearance: alert, appears stated age and cooperative  Skin: Skin color, texture, turgor normal.   HEENT: Head: Normocephalic, no lesions, without obvious abnormality.   Neck: no adenopathy, no carotid bruit, no JVD and supple, symmetrical, trachea midline  Lungs: clear to auscultation bilaterally  Heart: regular rate and rhythm, S1, S2 normal, no murmur, click, rub or gallop  Abdomen: soft, non-tender; bowel sounds normal; no masses,  no organomegaly  Extremities: extremities normal, atraumatic, no cyanosis or edema  Lymphatic: No significant lymph node enlargement papable  Neurologic: Mental status: Alert, oriented, thought content appropriate        Assessment & Plan:    · AAA without rupture- not a candidate for EVAR and not candidate for open AAA per vascular surgery note- talked to [facility] Vascular Surgeon on call who said \"it doesn't make any sense\" but he will admit to his service once bed available to evaluate the patient himself  · PVD with occlusion of right common illiac artery   · New onset ascites- evaluated by GI- cannot drink more alcohol- 2g of salt daily - cut down read meat- continue aldactone and lasix on discharge and follow up with GI clinic in 3-4 weeks  · Alcohol use disorder   · Alcoholic cirrhosis  · HTN            Disposition: transfer to [facility] once bed available, pt has been accepted     Bryan Ramos MD

## 2022-02-22 NOTE — PROGRESS NOTES
Governdiana Muhammad arrived to room # 324. Presented with: aneurysm  Mental Status: Patient is oriented, alert, coherent, logical, thought processes intact and able to concentrate and follow conversation. Vitals:    02/22/22 0011   BP: 120/69   Pulse: 87   Resp: 16   Temp: 98.1 °F (36.7 °C)   SpO2: 96%     Patient safety contract and falls prevention contract reviewed with patient Yes. Oriented Patient and Family to room. Call light within reach. Yes.   Needs, issues or concerns expressed at this time: no.      Electronically signed by Shelia Sadler RN on 2/22/2022 at 1:36 AM

## 2022-02-22 NOTE — CONSULTS
[id number] [facility] Vascular Surgery    CONSULT    Patient:  Arline Leggett  YOB: 1960  Date of Service: 2/22/2022  MRN: [id number]   Acct: [de-identified]   Primary Care Physician: ESTHER Verde CNP    Reason for consult: AAA, Right Iliac Occlusion    Requesting Physician: Dr. Laura Gold    History Obtained From: Patient    HISTORY OF PRESENT ILLNESS:  Mr. Arline Leggett is a 64 y.o. male, with PMHx HTN, HLD, Hx alcohol abuse with cirrhosis, who was found to have AAA who presented to the ER with abdominal pain. Work-up in the ER CBC and CBC were unremarkable. CTA abdominal aorta with runoff revealed 6.7 x 6.2 cm fusiform aneurysmal dilatation of distal abdominal aorta with partial lumen mural thrombus, complete occlusion of right proximal common iliac artery with re-opacification of distal right common iliac artery. Vascular surgery was called. Patient admitted to hospitalist service. Past Medical History:       Diagnosis Date    Alcohol abuse     Alcoholic cirrhosis, unspecified whether ascites present ([address]     Smoker        Past Surgical History:    No past surgical history on file. Allergies:  Patient has no known allergies.     Medications Current:   Current Facility-Administered Medications   Medication Dose Route Frequency Provider Last Rate Last Admin    enoxaparin (LOVENOX) injection 40 mg  40 mg SubCUTAneous Daily Stacy Alicea MD        ondansetron (ZOFRAN-ODT) disintegrating tablet 4 mg  4 mg Oral Q8H PRN Stacy Alicea MD        Or    ondansetron [facility]) injection 4 mg  4 mg IntraVENous Q6H PRN Stacy Alicea MD        polyethylene glycol [facility]) packet 17 g  17 g Oral Daily PRN Stacy Alicea MD        acetaminophen (TYLENOL) tablet 650 mg  650 mg Oral Q6H PRN Stacy Alicea MD        Or    acetaminophen (TYLENOL) suppository 650 mg  650 mg Rectal Q6H PRN Stacy Alicea MD        hydrALAZINE (APRESOLINE) injection 5 mg  5 mg IntraVENous Q4H PRN Steve Young MD        furosemide (LASIX) injection 40 mg  40 mg IntraVENous Daily Steve Young MD   40 mg at 02/22/22 0856    spironolactone (ALDACTONE) tablet 25 mg  25 mg Oral BID Steve Young MD   25 mg at 02/22/22 0856    pantoprazole (PROTONIX) tablet 40 mg  40 mg Oral QAM AC Steve Young MD   40 mg at 02/22/22 0544    sodium chloride flush 0.9 % injection 5-40 mL  5-40 mL IntraVENous 2 times per day Genia Greenemily, APRN - CNP   10 mL at 02/22/22 0910    sodium chloride flush 0.9 % injection 5-40 mL  5-40 mL IntraVENous PRN Genia Greener, APRN - CNP        0.9 % sodium chloride infusion  25 mL IntraVENous PRN Genia Greener, APRN - CNP        LORazepam (ATIVAN) tablet 1 mg  1 mg Oral Q1H PRN Genia Greener, APRN - CNP        Or    LORazepam (ATIVAN) injection 1 mg  1 mg IntraVENous Q1H PRN Genia Greener, APRN - CNP        Or    LORazepam (ATIVAN) tablet 2 mg  2 mg Oral Q1H PRN Genia Greener, APRN - CNP        Or    LORazepam (ATIVAN) injection 2 mg  2 mg IntraVENous Q1H PRN Genia Greener, APRN - CNP        Or    LORazepam (ATIVAN) tablet 3 mg  3 mg Oral Q1H PRN Genia Greener, APRN - CNP        Or    LORazepam (ATIVAN) injection 3 mg  3 mg IntraVENous Q1H PRN Genia Greener, APRN - CNP        Or    LORazepam (ATIVAN) tablet 4 mg  4 mg Oral Q1H PRN Genia Greener, APRN - CNP        Or    LORazepam (ATIVAN) injection 4 mg  4 mg IntraVENous Q1H PRN Genia Greener, APRN - CNP        morphine injection 4 mg  4 mg IntraVENous Q3H PRN Steve Young MD           Social History:   reports that he has been smoking cigarettes. He has a 40.00 pack-year smoking history. He has never used smokeless tobacco. He reports previous alcohol use. He reports that he does not use drugs. Family History:  No family history on file. REVIEW OF SYSTEMS:  General: Denies any fever or chills.  Denies any unexplained weight loss or gain. Denies any change in activity or endurance. HEENT: Denies any headaches or visual changes. Respiratory: Denies any cough or hoarseness. Cardiac: Denies any chest pain or pressure. Denies any palpitations. Denies any presyncope or syncope. Denies any orthopnea or PND. Denies any lower extremity edema. GI: + abdominal pain. Denies any nausea or vomiting. Denies any recent history of GI tract blood loss. + constipation. : Denies any hematuria, frequency, hesitancy, or dysuria. Musculoskeletal: Denies any pain or swelling in his joints. Pain, weakness of RLE. Neurological: Denies any paraesthesias. Denies any history of seizure or stroke symptoms. Psychological: Denies any problems with anxiety or depression. All other systems are negative except where stated above. PHYSICAL EXAM:  /72   Pulse 84   Temp 98.4 °F (36.9 °C)   Resp 16   Ht 5' 7\" (1.702 m)   Wt 170 lb 8 oz (77.3 kg)   SpO2 94%   BMI 26.70 kg/m²   General appearance: Demonstrates an ill-appearing male who is alert and oriented in no acute distress. HEENT: Normocephalic. Atraumatic. JOCELINE. NECK: Supple. NO JVD. No carotids bruits auscultated. Chest: Clear to auscultation bilaterally without wheezes or rhonchi. Cardiac: Normal heart tones with regular rate and rhythm, S1, S2 normal. No murmurs, gallops, or rubs auscultated. Abdomen: Soft, non-tender; non-distended normal bowel sounds no masses, no organomegaly. No fluid wave. Extremities: No clubbing or cyanosis. No peripheral edema. Soft intermittent monophasic doppler signal of right DP. Loud monophasic doppler signal of right PT. Loud biphasic doppler signals - left DP and PT. Skin: Skin color, texture, turgor normal. Psoriasis of extremities. Neurologic: Grossly intact. LABS AND DIAGNOSTICS:    CTA Abdominal Aorta with Bilateral Runoff with Contrast:  1. The severe atheromatous changes of the abdominal aorta and iliac and femoral arteries.    2. Fusiform aneurysmal dilatation of the distal abdominal aorta which measure 6.7 x 6.2 cm. Partial lumen mural thrombosis. 3. Complete occlusion of the right proximal common iliac artery. The re-opacification of the distal right common iliac artery adjacent and above the bifurcation from the inferior epigastric and circumflex iliac collateral circulation. 4. There are small and attenuated three-vessel runoff bilaterally. 5. Moderate abdominal and pelvic ascites. 6. Hepatic cirrhosis a moderate splenomegaly. 7. A mild mesenteric adenopathy. CBC with Differential:   Lab Results   Component Value Date    WBC 6.2 02/22/2022    RBC 3.71 02/22/2022    HGB 12.6 02/22/2022    HCT 37.8 02/22/2022     02/22/2022    .9 02/22/2022     BMP:   Lab Results   Component Value Date     02/22/2022    K 4.1 02/22/2022     02/22/2022    CO2 22 02/22/2022    BUN 15 02/22/2022    CREATININE 0.8 02/22/2022    CALCIUM 9.3 02/22/2022    GFRAA >59 02/22/2022    LABGLOM >60 02/22/2022    GLUCOSE 99 02/22/2022       ASSESSMENT:    1. AAA without Rupture  2. PVD with Occlusion of Right Common Iliac Artery  3. Essential HTN  4. Mixed Hyperlipidemia  5. Hx of Alcohol Abuse - Last drink was 3 weeks ago  6. Alcoholic Cirrhosis with Ascites noted on CT Scan  7. Chronic Everyday Cigarette Smoker  8. Psoriasis      PLAN:    1. Review of CTA abdominal aorta with run-off with Dr. Bg Tello - patient is not a candidate for EVAR and is not a suitable candidate for open AAA. Recommend transfer to tertiary care center for AAA repair.        Katalina Osuna, APRN

## 2022-02-22 NOTE — CONSULTS
Pt Name: Rich Camara  MRN: [id number]  [id number]  YOB: 1960  Admit Date: 2/21/2022  2:00 PM  Date of evaluation: 2/22/2022  Primary Care Physician: ESTHER Ibrahim CNP   [id number]       Requesting Provider:  Dr. Dionna Beck    Indication: Ascites. New onset cirrhosis of liver. History:  The patient is a 64 y.o. male admitted to the hospital with symptoms of abdominal distention and some pain and discomfort. According the patient for last 6 weeks he started noticing gradual distention of the abdomen. The distention was getting worse and he was started having some abdominal discomfort. He denies any other associated GI symptoms. Denies any previous history of similar symptoms. No he told him in the past that he has any liver problem. Patient does drink alcohol on daily basis quite heavily. He claims that he quit about 2 months back. No heartburns or regurgitation. No nausea or vomiting. Some anorexia but no weight loss. No constipation or diarrhea on a regular basis except lately he is having some constipation. No hematochezia or melanotic stool. No fever chills. No chest pain or palpitation. Since he is in the hospital he started feeling better his abdominal pain is completely resolved. He received some diuretics and his abdominal distention is much less now. Past Medical History:        Diagnosis Date    AAA (abdominal aortic aneurysm) without rupture (HCC)     Alcohol abuse     Alcoholic cirrhosis, unspecified whether ascites present ([facility] [address]     Mixed hyperlipidemia     Primary hypertension     Psoriasis     Smoker      Past Surgical History:    No past surgical history on file. Allergies:  Patient has no known allergies. Home Meds:  Prior to Admission medications    Medication Sig Start Date End Date Taking?  Authorizing Provider   bisoprolol-hydroCHLOROthiazide (ZIAC) 5-6.25 MG per tablet Take 1 tablet by mouth daily   Yes Historical Provider, MD   hydrOXYzine (ATARAX) 50 MG tablet Take 50 mg by mouth nightly as needed for Itching   Yes Historical Provider, MD   omeprazole (PRILOSEC) 40 MG delayed release capsule Take 40 mg by mouth daily   Yes Historical Provider, MD   spironolactone (ALDACTONE) 100 MG tablet Take 100 mg by mouth 2 times daily For 30 days. Filled 2/11/22. Yes Historical Provider, MD        Current Meds:       enoxaparin  40 mg SubCUTAneous Daily    furosemide  40 mg IntraVENous Daily    spironolactone  25 mg Oral BID    pantoprazole  40 mg Oral QAM AC    sodium chloride flush  5-40 mL IntraVENous 2 times per day        sodium chloride           Social History:   Social History     Socioeconomic History    Marital status:      Spouse name: Not on file    Number of children: Not on file    Years of education: Not on file    Highest education level: Not on file   Occupational History    Not on file   Tobacco Use    Smoking status: Current Every Day Smoker     Packs/day: 1.00     Years: 40.00     Pack years: 40.00     Types: Cigarettes    Smokeless tobacco: Never Used   Substance and Sexual Activity    Alcohol use: Not Currently    Drug use: Never    Sexual activity: Not on file   Other Topics Concern    Not on file   Social History Narrative    Not on file     Social Determinants of Health     Financial Resource Strain:     Difficulty of Paying Living Expenses: Not on file   Food Insecurity:     Worried About Running Out of Food in the Last Year: Not on file    Sonny of Food in the Last Year: Not on file   Transportation Needs:     Lack of Transportation (Medical): Not on file    Lack of Transportation (Non-Medical):  Not on file   Physical Activity:     Days of Exercise per Week: Not on file    Minutes of Exercise per Session: Not on file   Stress:     Feeling of Stress : Not on file   Social Connections:     Frequency of Communication with Friends and Family: Not on file    Frequency of Social Gatherings with Friends and Family: Not on file    Attends [employer] Services: Not on file    Active Member of Clubs or Organizations: Not on file    Attends Club or Organization Meetings: Not on file    Marital Status: Not on file   Intimate Partner Violence:     Fear of Current or Ex-Partner: Not on file    Emotionally Abused: Not on file    Physically Abused: Not on file    Sexually Abused: Not on file   Housing Stability:     Unable to Pay for Housing in the Last Year: Not on file    Number of [place] in the Last Year: Not on file    Unstable Housing in the Last Year: Not on file       Family History:   No family history on file. ROS:  Noncontributory. Physical Exam:  /72   Pulse 84   Temp 98.4 °F (36.9 °C)   Resp 16   Ht 5' 7\" (1.702 m)   Wt 170 lb 8 oz (77.3 kg)   SpO2 94%   BMI 26.70 kg/m²     General appearance: alert and cooperative with exam, appears stated age, no acute distress   Head: normal cephalic, atraumatic. EOMI bilaterally, no neck lymphadenopathy appreciated, no carotid bruits noted  Lungs: Clear to percussion and auscultation. No wheezing or rales. Heart: S1 S2 are audible. No added sound. Abdomen: Soft, slightly distended and non tender. No hepatosplenomegaly. Bowel sounds are audible. No masses palpable. Ascites and umbilical hernia was noted. Skin: warm, dry, no obvious rash, non-jaundice  Extremities: No cyanosis or clubbing or peripheral edema noted. Dorsalis pedis pulses were intact.         Labs:     Recent Labs     02/21/22  1424 02/22/22  0325   WBC 6.0 6.2   RBC 3.81* 3.71*   HGB 13.0* 12.6*   HCT 38.6* 37.8*   .3* 101.9*   MCH 34.1* 34.0*   MCHC 33.7 33.3    122*     Recent Labs     02/21/22  1424 02/22/22  0325    138   K 4.4 4.1   ANIONGAP 12 11    105   CO2 22 22   BUN 14 15   CREATININE 0.7 0.8   GLUCOSE 151* 99   CALCIUM 9.4 9.3     Recent Labs     02/21/22  1424   MG 1.7     Recent Labs     02/21/22  1424 02/22/22  0325   AST 40 37   ALT 19 19   BILITOT 1.2 0.9   ALKPHOS 151* 150*     HgBA1c:  No components found for: HGBA1C  FLP:    Lab Results   Component Value Date    TRIG 91 02/22/2022    HDL 41 02/22/2022    LDLCALC 70 02/22/2022     TSH:    Lab Results   Component Value Date    TSH 2.290 02/21/2022     Troponin T: No results for input(s): TROPONINI in the last 72 hours. INR:   Recent Labs     02/21/22  1430   INR 1.38*       Recent Labs     02/21/22  1424 02/22/22  0325   LIPASE 89* 73*       Radiology:    CTA ABDOMINAL AORTA W BILAT RUNOFF W CONTRAST    Result Date: 2/21/2022  1. The severe atheromatous changes of the abdominal aorta and iliac and femoral arteries. 2. Fusiform aneurysmal dilatation of the distal abdominal aorta which measure 6.7 x 6.2 cm. Partial lumen mural thrombosis. The details is given above. 3. Complete occlusion of the right proximal common iliac artery. The re-opacification of the distal right common iliac artery adjacent and above the bifurcation from the inferior epigastric and circumflex iliac collateral circulation. 4. There are small and attenuated three-vessel runoff bilaterally. 5. Moderate abdominal and pelvic ascites. 6. Hepatic cirrhosis a moderate splenomegaly. 7. A mild mesenteric adenopathy. The above findings were reported to ER physician, Dr. Darshan He, immediately. Signed by Dr Morgan Fuel:  Patient admitted to the hospital with gradual onset of abdominal distention. Work-up revealed cirrhosis of liver and ascites. He had a good response to diuretics. Most likely etiology is alcohol abuse which he quit. Impression:  1. New onset ascites. 2.  Cirrhosis liver, most likely secondary alcohol abuse. Plan:  1. Differential diagnosis of chronic liver disease were discussed at length with him in the presence of family member. 2.  Cirrhosis of liver along with all the late complications were discussed.   3.  I advised him to have complete abstinence from alcohol for surface life. 4.  I told him to take only 2 g of salt on a daily basis and cut down red meat. 5.  If patient clinically stable he can be discharged home on Aldactone and Lasix. 6.  Follow-up in GI clinic in 3 to 4 weeks time.     Linh Mojica MD  2/22/2022, 1:49 PM

## 2022-02-22 NOTE — PLAN OF CARE
Problem: Skin Integrity:  Goal: Will show no infection signs and symptoms  Description: Will show no infection signs and symptoms  2/22/2022 1159 by Gerry Menon RN  Outcome: Ongoing  2/22/2022 0136 by Alessandra Villa RN  Outcome: Ongoing  Goal: Absence of new skin breakdown  Description: Absence of new skin breakdown  2/22/2022 1159 by Gerry Menon RN  Outcome: Ongoing  2/22/2022 0136 by Alessandra Villa RN  Outcome: Ongoing  Goal: Demonstration of wound healing without infection will improve  Description: Demonstration of wound healing without infection will improve  Outcome: Ongoing  Goal: Complications related to intravenous access or infusion will be avoided or minimized  Description: Complications related to intravenous access or infusion will be avoided or minimized  Outcome: Ongoing     Problem: Falls - Risk of:  Goal: Will remain free from falls  Description: Will remain free from falls  2/22/2022 1159 by Gerry Menon RN  Outcome: Ongoing  2/22/2022 0136 by Alessandra Villa RN  Outcome: Ongoing  Goal: Absence of physical injury  Description: Absence of physical injury  2/22/2022 1159 by Gerry Menon RN  Outcome: Ongoing  2/22/2022 0136 by Alessandra Villa RN  Outcome: Ongoing     Problem:  Activity:  Goal: Risk for activity intolerance will decrease  Description: Risk for activity intolerance will decrease  Outcome: Ongoing     Problem: Coping:  Goal: Verbalizations of decreased anxiety will decrease  Description: Verbalizations of decreased anxiety will decrease  Outcome: Ongoing  Goal: Family's ability to cope with current situation will improve  Description: Family's ability to cope with current situation will improve  Outcome: Ongoing     Problem: Health Behavior:  Goal: Ability to manage health-related needs will improve  Description: Ability to manage health-related needs will improve  Outcome: Ongoing  Goal: Ability to identify and utilize available support systems will improve  Description: Ability to identify and utilize available support systems will improve  Outcome: Ongoing     Problem: Physical Regulation:  Goal: Complications related to the disease process, condition or treatment will be avoided or minimized  Description: Complications related to the disease process, condition or treatment will be avoided or minimized  Outcome: Ongoing  Goal: Hemodynamic stability will improve  Description: Hemodynamic stability will improve  Outcome: Ongoing  Goal: Diagnostic test results will improve  Description: Diagnostic test results will improve  Outcome: Ongoing  Goal: Will remain free from infection  Description: Will remain free from infection  Outcome: Ongoing  Goal: Ability to maintain vital signs within normal range will improve  Description: Ability to maintain vital signs within normal range will improve  Outcome: Ongoing     Problem: Sensory:  Goal: General experience of comfort will improve  Description: General experience of comfort will improve  Outcome: Ongoing     Problem: Discharge Planning:  Goal: Discharged to appropriate level of care  Description: Discharged to appropriate level of care  Outcome: Ongoing     Problem: Fluid Volume - Deficit:  Goal: Absence of fluid volume deficit signs and symptoms  Description: Absence of fluid volume deficit signs and symptoms  Outcome: Ongoing     Problem: Nutrition Deficit:  Goal: Ability to achieve adequate nutritional intake will improve  Description: Ability to achieve adequate nutritional intake will improve  Outcome: Ongoing     Problem: Sleep Pattern Disturbance:  Goal: Appears well-rested  Description: Appears well-rested  Outcome: Ongoing     Problem: Violence - Risk of, Self/Other-Directed:  Goal: Knowledge of developmental care interventions  Description: Absence of violence  Outcome: Ongoing     Problem: Nutritional:  Goal: Nutritional status will improve  Description: Nutritional status will improve  Outcome: Ongoing     Problem: Respiratory:  Goal: Ability to maintain normal respiratory secretions will improve  Description: Ability to maintain normal respiratory secretions will improve  Outcome: Ongoing

## 2022-02-22 NOTE — PLAN OF CARE
Problem: Skin Integrity:  Goal: Will show no infection signs and symptoms  Description: Will show no infection signs and symptoms  Outcome: Ongoing  Goal: Absence of new skin breakdown  Description: Absence of new skin breakdown  Outcome: Ongoing     Problem: Falls - Risk of:  Goal: Will remain free from falls  Description: Will remain free from falls  Outcome: Ongoing     Problem: Falls - Risk of:  Goal: Absence of physical injury  Description: Absence of physical injury  Outcome: Ongoing

## 2022-02-22 NOTE — PROGRESS NOTES
4 Eyes Skin Assessment    Katharine Benitez is being assessed upon: Admission    I agree that I, Brayden Slaughter RN, along with Aleksandra Reid RN (either 2 RN's or 1 LPN and 1 RN) have performed a thorough Head to Toe Skin Assessment on the patient. ALL assessment sites listed below have been assessed. Areas assessed by both nurses:     [x]   Head, Face, and Ears   [x]   Shoulders, Back, and Chest  [x]   Arms, Elbows, and Hands   [x]   Coccyx, Sacrum, and Ischium  [x]   Legs, Feet, and Heels    Does the Patient have Skin Breakdown? No    Jesus Prevention initiated: No  Wound Care Orders initiated: No    WOC nurse consulted for Pressure Injury (Stage 3,4, Unstageable, DTI, NWPT, and Complex wounds) and New or Established Ostomies: No        Primary Nurse eSignature:  Brayden Slaughter RN on 2/22/2022 at 6:16 AM      Co-Signer eSignature: {Esignature:[id number]}

## 2022-02-22 NOTE — PROGRESS NOTES
Comprehensive Nutrition Assessment    Type and Reason for Visit:  Initial,Positive Nutrition Screen    Nutrition Recommendations/Plan: continue current POC    Nutrition Assessment:  Pt appears adequately nourished. Intake has improved--now %. Memo Moran gave him something for his stomach and t has worked really good. \"    Malnutrition Assessment:  Malnutrition Status:  No malnutrition    Context:  Acute Illness     Findings of the 6 clinical characteristics of malnutrition:  Energy Intake:  Mild decrease in energy intake (Comment)  Weight Loss:  1 - 5% over 1 month     Body Fat Loss:  No significant body fat loss     Muscle Mass Loss:  No significant muscle mass loss    Fluid Accumulation:  No significant fluid accumulation Extremities   Strength:       Nutrition Related Findings:  adequately nourished      Wounds:  None       Current Nutrition Therapies:    ADULT DIET; Regular; Low Fat/Low Chol/High Fiber/DEMOND; Low Sodium (2 gm)    Anthropometric Measures:  · Height: 5' 7\" (170.2 cm)  · Current Body Weight: 170 lb 8 oz (77.3 kg)   · Admission Body Weight: 180 lb (81.6 kg)    · Usual Body Weight:       · Ideal Body Weight: 148 lbs; % Ideal Body Weight 115.2 %   · BMI: 26.7  · Adjusted Body Weight:  ; No Adjustment   · BMI Categories: Overweight (BMI 25.0-29. 9)       Nutrition Diagnosis:   · Inadequate oral intake related to altered GI function,pain as evidenced by weight loss,lab values      Nutrition Interventions:   Food and/or Nutrient Delivery:  Continue Current Diet  Nutrition Education/Counseling:  No recommendation at this time   Coordination of Nutrition Care:  Continue to monitor while inpatient    Goals:  po intake 50% or greater.   Weight stable       Nutrition Monitoring and Evaluation:   Behavioral-Environmental Outcomes:  None Identified   Food/Nutrient Intake Outcomes:  Food and Nutrient Intake  Physical Signs/Symptoms Outcomes:  Biochemical Data,Chewing or Swallowing,Nausea or Vomiting,Fluid Status or Edema,Skin,Weight     Discharge Planning:    Continue current diet     Electronically signed by Elodia Sweet MS, RD, LD on 2/22/22 at 1:47 PM CST    Contact: [phone]

## 2022-02-23 LAB
ALBUMIN SERPL-MCNC: 2.6 G/DL (ref 3.5–5.2)
ALP BLD-CCNC: 145 U/L (ref 40–130)
ALT SERPL-CCNC: 17 U/L (ref 5–41)
ANION GAP SERPL CALCULATED.3IONS-SCNC: 9 MMOL/L (ref 7–19)
AST SERPL-CCNC: 35 U/L (ref 5–40)
BILIRUB SERPL-MCNC: 1 MG/DL (ref 0.2–1.2)
BUN BLDV-MCNC: 20 MG/DL (ref 8–23)
CALCIUM SERPL-MCNC: 9.1 MG/DL (ref 8.8–10.2)
CHLORIDE BLD-SCNC: 101 MMOL/L (ref 98–111)
CO2: 26 MMOL/L (ref 22–29)
CREAT SERPL-MCNC: 1 MG/DL (ref 0.5–1.2)
GFR AFRICAN AMERICAN: >59
GFR NON-AFRICAN AMERICAN: >60
GLUCOSE BLD-MCNC: 138 MG/DL (ref 74–109)
HCT VFR BLD CALC: 35.2 % (ref 42–52)
HEMOGLOBIN: 11.9 G/DL (ref 14–18)
MCH RBC QN AUTO: 33.2 PG (ref 27–31)
MCHC RBC AUTO-ENTMCNC: 33.8 G/DL (ref 33–37)
MCV RBC AUTO: 98.3 FL (ref 80–94)
PDW BLD-RTO: 13.4 % (ref 11.5–14.5)
PLATELET # BLD: 122 K/UL (ref 130–400)
PMV BLD AUTO: 10.9 FL (ref 9.4–12.4)
POTASSIUM REFLEX MAGNESIUM: 3.8 MMOL/L (ref 3.5–5)
RBC # BLD: 3.58 M/UL (ref 4.7–6.1)
SODIUM BLD-SCNC: 136 MMOL/L (ref 136–145)
TOTAL PROTEIN: 6.8 G/DL (ref 6.6–8.7)
WBC # BLD: 6 K/UL (ref 4.8–10.8)

## 2022-02-23 PROCEDURE — 6370000000 HC RX 637 (ALT 250 FOR IP): Performed by: NURSE PRACTITIONER

## 2022-02-23 PROCEDURE — 2580000003 HC RX 258

## 2022-02-23 PROCEDURE — 6360000002 HC RX W HCPCS: Performed by: HOSPITALIST

## 2022-02-23 PROCEDURE — 6370000000 HC RX 637 (ALT 250 FOR IP): Performed by: HOSPITALIST

## 2022-02-23 PROCEDURE — 80053 COMPREHEN METABOLIC PANEL: CPT

## 2022-02-23 PROCEDURE — 85027 COMPLETE CBC AUTOMATED: CPT

## 2022-02-23 PROCEDURE — 1210000000 HC MED SURG R&B

## 2022-02-23 PROCEDURE — 36415 COLL VENOUS BLD VENIPUNCTURE: CPT

## 2022-02-23 RX ORDER — SPIRONOLACTONE 50 MG/1
50 TABLET, FILM COATED ORAL 2 TIMES DAILY
Status: DISCONTINUED | OUTPATIENT
Start: 2022-02-23 | End: 2022-02-25 | Stop reason: HOSPADM

## 2022-02-23 RX ORDER — FUROSEMIDE 40 MG/1
40 TABLET ORAL DAILY
Status: DISCONTINUED | OUTPATIENT
Start: 2022-02-23 | End: 2022-02-25 | Stop reason: HOSPADM

## 2022-02-23 RX ORDER — BISACODYL 10 MG
10 SUPPOSITORY, RECTAL RECTAL DAILY
Status: DISCONTINUED | OUTPATIENT
Start: 2022-02-23 | End: 2022-02-25 | Stop reason: HOSPADM

## 2022-02-23 RX ORDER — FUROSEMIDE 40 MG/1
40 TABLET ORAL DAILY
Qty: 90 TABLET | Refills: 3 | Status: SHIPPED | OUTPATIENT
Start: 2022-02-24

## 2022-02-23 RX ORDER — NICOTINE 21 MG/24HR
1 PATCH, TRANSDERMAL 24 HOURS TRANSDERMAL DAILY
Status: DISCONTINUED | OUTPATIENT
Start: 2022-02-23 | End: 2022-02-25 | Stop reason: HOSPADM

## 2022-02-23 RX ORDER — SPIRONOLACTONE 50 MG/1
50 TABLET, FILM COATED ORAL 2 TIMES DAILY
Qty: 60 TABLET | Refills: 3
Start: 2022-02-23

## 2022-02-23 RX ORDER — LACTULOSE 10 G/15ML
20 SOLUTION ORAL 3 TIMES DAILY
Status: DISCONTINUED | OUTPATIENT
Start: 2022-02-23 | End: 2022-02-25 | Stop reason: HOSPADM

## 2022-02-23 RX ORDER — LACTULOSE 10 G/15ML
20 SOLUTION ORAL 2 TIMES DAILY
Qty: 1800 ML | Refills: 3 | Status: SHIPPED | OUTPATIENT
Start: 2022-02-23 | End: 2022-03-25

## 2022-02-23 RX ADMIN — SODIUM CHLORIDE, PRESERVATIVE FREE 10 ML: 5 INJECTION INTRAVENOUS at 08:57

## 2022-02-23 RX ADMIN — PANTOPRAZOLE SODIUM 40 MG: 40 TABLET, DELAYED RELEASE ORAL at 06:06

## 2022-02-23 RX ADMIN — ONDANSETRON 4 MG: 2 INJECTION INTRAMUSCULAR; INTRAVENOUS at 12:34

## 2022-02-23 RX ADMIN — BISACODYL 10 MG: 10 SUPPOSITORY RECTAL at 13:26

## 2022-02-23 RX ADMIN — SPIRONOLACTONE 25 MG: 25 TABLET ORAL at 08:55

## 2022-02-23 RX ADMIN — POLYETHYLENE GLYCOL 3350 17 G: 17 POWDER, FOR SOLUTION ORAL at 11:17

## 2022-02-23 RX ADMIN — SODIUM CHLORIDE, PRESERVATIVE FREE 10 ML: 5 INJECTION INTRAVENOUS at 20:01

## 2022-02-23 RX ADMIN — FUROSEMIDE 40 MG: 40 TABLET ORAL at 13:26

## 2022-02-23 RX ADMIN — FUROSEMIDE 40 MG: 10 INJECTION, SOLUTION INTRAMUSCULAR; INTRAVENOUS at 08:54

## 2022-02-23 RX ADMIN — SPIRONOLACTONE 50 MG: 50 TABLET ORAL at 18:22

## 2022-02-23 ASSESSMENT — PAIN SCALES - GENERAL
PAINLEVEL_OUTOF10: 0

## 2022-02-23 NOTE — PLAN OF CARE
Problem: Skin Integrity:  Goal: Will show no infection signs and symptoms  Description: Will show no infection signs and symptoms  2/23/2022 0016 by Hernando Pennington RN  Outcome: Ongoing  2/22/2022 1159 by Isabel Weeks RN  Outcome: Ongoing  Goal: Absence of new skin breakdown  Description: Absence of new skin breakdown  2/23/2022 0016 by Hernando Pennington RN  Outcome: Ongoing  2/22/2022 1159 by Isabel Weeks RN  Outcome: Ongoing  Goal: Demonstration of wound healing without infection will improve  Description: Demonstration of wound healing without infection will improve  2/23/2022 0016 by Hernando Pennington RN  Outcome: Ongoing  2/22/2022 1159 by Isabel Weeks RN  Outcome: Ongoing  Goal: Complications related to intravenous access or infusion will be avoided or minimized  Description: Complications related to intravenous access or infusion will be avoided or minimized  2/23/2022 0016 by Hernando Pennington RN  Outcome: Ongoing  2/22/2022 1159 by Isabel Weeks RN  Outcome: Ongoing     Problem: Falls - Risk of:  Goal: Will remain free from falls  Description: Will remain free from falls  2/23/2022 0016 by Hernando Pennington RN  Outcome: Ongoing  2/22/2022 1159 by Isabel Weeks RN  Outcome: Ongoing  Goal: Absence of physical injury  Description: Absence of physical injury  2/23/2022 0016 by Hernando Pennington RN  Outcome: Ongoing  2/22/2022 1159 by Isabel Weeks RN  Outcome: Ongoing     Problem:  Activity:  Goal: Risk for activity intolerance will decrease  Description: Risk for activity intolerance will decrease  2/23/2022 0016 by Hernando Pennington RN  Outcome: Ongoing  2/22/2022 1159 by Isabel Weeks RN  Outcome: Ongoing     Problem: Coping:  Goal: Verbalizations of decreased anxiety will decrease  Description: Verbalizations of decreased anxiety will decrease  2/23/2022 0016 by Hernando Pennington RN  Outcome: Ongoing  2/22/2022 1159 by Isabel Weeks RN  Outcome: Ongoing  Goal: Family's ability to cope with current situation will Ongoing     Problem: Sensory:  Goal: General experience of comfort will improve  Description: General experience of comfort will improve  2/23/2022 0016 by Wojciech Ortega RN  Outcome: Ongoing  2/22/2022 1159 by Fareed Trejo RN  Outcome: Ongoing     Problem: Discharge Planning:  Goal: Discharged to appropriate level of care  Description: Discharged to appropriate level of care  2/23/2022 0016 by Wojciech Ortega RN  Outcome: Ongoing  2/22/2022 1159 by Fareed Trejo RN  Outcome: Ongoing     Problem: Fluid Volume - Deficit:  Goal: Absence of fluid volume deficit signs and symptoms  Description: Absence of fluid volume deficit signs and symptoms  2/23/2022 0016 by Wojciech Ortega RN  Outcome: Ongoing  2/22/2022 1159 by Fareed Trejo RN  Outcome: Ongoing     Problem: Nutrition Deficit:  Goal: Ability to achieve adequate nutritional intake will improve  Description: Ability to achieve adequate nutritional intake will improve  2/23/2022 0016 by Wojciech Ortega RN  Outcome: Ongoing  2/22/2022 1159 by Fareed Trejo RN  Outcome: Ongoing     Problem: Sleep Pattern Disturbance:  Goal: Appears well-rested  Description: Appears well-rested  2/23/2022 0016 by Wojciech Ortega RN  Outcome: Ongoing  2/22/2022 1159 by Fareed Trejo RN  Outcome: Ongoing     Problem: Violence - Risk of, Self/Other-Directed:  Goal: Knowledge of developmental care interventions  Description: Absence of violence  2/23/2022 0016 by Wojciech Ortega RN  Outcome: Ongoing  2/22/2022 1159 by Fareed Trejo RN  Outcome: Ongoing     Problem: Nutritional:  Goal: Nutritional status will improve  Description: Nutritional status will improve  2/23/2022 0016 by Wojciech Ortega RN  Outcome: Ongoing  2/22/2022 1159 by Fareed Trejo RN  Outcome: Ongoing     Problem: Respiratory:  Goal: Ability to maintain normal respiratory secretions will improve  Description: Ability to maintain normal respiratory secretions will improve  2/23/2022 0016 by Wojciech Ortega RN  Outcome: Ongoing  2/22/2022 1159 by Ace Blancas RN  Outcome: Ongoing

## 2022-02-23 NOTE — PLAN OF CARE
Problem: Skin Integrity:  Goal: Will show no infection signs and symptoms  Description: Will show no infection signs and symptoms  Outcome: Ongoing  Goal: Absence of new skin breakdown  Description: Absence of new skin breakdown  Outcome: Ongoing  Goal: Demonstration of wound healing without infection will improve  Description: Demonstration of wound healing without infection will improve  Outcome: Ongoing  Goal: Complications related to intravenous access or infusion will be avoided or minimized  Description: Complications related to intravenous access or infusion will be avoided or minimized  Outcome: Ongoing     Problem: Falls - Risk of:  Goal: Will remain free from falls  Description: Will remain free from falls  Outcome: Ongoing  Goal: Absence of physical injury  Description: Absence of physical injury  Outcome: Ongoing     Problem:  Activity:  Goal: Risk for activity intolerance will decrease  Description: Risk for activity intolerance will decrease  Outcome: Ongoing     Problem: Coping:  Goal: Verbalizations of decreased anxiety will decrease  Description: Verbalizations of decreased anxiety will decrease  Outcome: Ongoing  Goal: Family's ability to cope with current situation will improve  Description: Family's ability to cope with current situation will improve  Outcome: Ongoing     Problem: Health Behavior:  Goal: Ability to manage health-related needs will improve  Description: Ability to manage health-related needs will improve  Outcome: Ongoing  Goal: Ability to identify and utilize available support systems will improve  Description: Ability to identify and utilize available support systems will improve  Outcome: Ongoing     Problem: Physical Regulation:  Goal: Complications related to the disease process, condition or treatment will be avoided or minimized  Description: Complications related to the disease process, condition or treatment will be avoided or minimized  Outcome: Ongoing  Goal: secretions will improve  Outcome: Ongoing     Problem: Nausea/Vomiting:  Goal: Ability to achieve adequate nutritional intake will improve  Description: Ability to achieve adequate nutritional intake will improve  Outcome: Ongoing  Goal: Absence of nausea/vomiting  Description: Absence of nausea/vomiting  Outcome: Ongoing  Goal: Able to drink  Description: Able to drink  Outcome: Ongoing  Goal: Able to eat  Description: Able to eat  Outcome: Ongoing     Problem:  Bowel/Gastric:  Goal: Ability to achieve a regular elimination pattern will improve  Description: Ability to achieve a regular elimination pattern will improve  Outcome: Ongoing  Goal: Occurrences of constipation will decrease  Description: Occurrences of constipation will decrease  Outcome: Ongoing

## 2022-02-23 NOTE — PROGRESS NOTES
Per [facility], INDIRA Flores has declined to admit patient. [facility] has accepted patient to a wait list once insurance provides an intent to transfer code. This nurse is attempting to call patient insurance company at this time to receive the insurance approval for transfer.      Electronically signed by Geovanni Brown RN on 2/22/22 at 6:36 PM CST

## 2022-02-23 NOTE — DISCHARGE SUMMARY
Discharge Summary      Date:2/23/2022        Patient Melvin Bradn     YOB: 1960     Age:61 y.o. Admit Date:2/21/2022   Admission Condition:fair   Discharged Condition:fair  Discharge Date: 02/23/22       Discharge Diagnoses   Principal Problem:    Aneurysm [facility])  Active Problems:    Alcoholic cirrhosis of liver with ascites ([facility] [address]    Ascites due to alcoholic cirrhosis ([facility] [address]  Resolved Problems:    * No resolved hospital problems. [facility] Stay   Narrative of Hospital Course:     28-year-old male with a history of alcohol abuse with related cirrhosis of the liver, presented to the hospital 2/21 with concerns of abdominal pain. Patient has been having abdominal pain for the past 2 months, was seen outpatient by GI, EGD and colonoscopy completed per patient's wife and noted to have 1 small polyp which was resected. Stated there was no other acute concerns. During outpatient work-up for abdominal pain was noted to have 5.9 cm abdominal aneurysm, was referred to vascular surgeon in [address] [place] [address] and sent for CT scan. Due to increasing and persistent abdominal pain, PCP referred patient to the emergency room for further evaluation. Work-up in the ER including CTA abdomen showed fusiform aneurysmal dilatation of the distal abdominal aorta 6.7 x 6.2 cm. 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 Moderate abdominal ascites was noted. 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 It is not suitable for  EVAR since there is no infrarenal  neck. He is not a good candidate for open repair. Recommended transfer to tertiary center. Patient's case was discussed with [facility]/ of [facility] and accepted for transfer and acceptance of .      Informed nurse that all imaging needs to be placed in the disc to accompany patient on transfer. Physical Examination:  General: Jaundiced, well-developed, no acute distress lying comfortably in bed. HEENT: Atraumatic normocephalic, range of motion normal, no JVD, no tracheal deviation noted. Cardiac: Normal S1-S2 no murmurs rub or gallop. Respiratory: clear To auscultation bilaterally, no rhonchi or rales, no wheezing  Abdomen: Soft, positive bowel sounds in all quadrants, no distention, mild tenderness to deep palpation in lower epigastric region. Extremities: no tenderness, no edema, moves all extremities  Psych: Affect normal and good eye contact, behavioral normal.        Consultants:   IP CONSULT TO VASCULAR SURGERY  IP CONSULT TO SOCIAL WORK  IP CONSULT TO GI    Time Spent on Discharge:  35 minutes were spent in patient examination, evaluation, counseling as well as medication reconciliation, prescriptions for required medications, discharge plan and follow up. Surgeries/Procedures Performed:  NONE      Significant Diagnostic Studies:   Recent Labs:    CBC:   Lab Results   Component Value Date    WBC 6.0 02/23/2022    RBC 3.58 02/23/2022    HGB 11.9 02/23/2022    HCT 35.2 02/23/2022    MCV 98.3 02/23/2022    MCH 33.2 02/23/2022    MCHC 33.8 02/23/2022    RDW 13.4 02/23/2022     02/23/2022     BMP:    Lab Results   Component Value Date    GLUCOSE 138 02/23/2022     02/23/2022    K 3.8 02/23/2022     02/23/2022    CO2 26 02/23/2022    ANIONGAP 9 02/23/2022    BUN 20 02/23/2022    CREATININE 1.0 02/23/2022    CALCIUM 9.1 02/23/2022    LABGLOM >60 02/23/2022    GFRAA >59 02/23/2022       Radiology Last 7 Days:  CTA ABDOMINAL AORTA W BILAT RUNOFF W CONTRAST    Result Date: 2/21/2022  1. The severe atheromatous changes of the abdominal aorta and iliac and femoral arteries. 2. Fusiform aneurysmal dilatation of the distal abdominal aorta which measure 6.7 x 6.2 cm. Partial lumen mural thrombosis. The details is given above.  3. Complete about where to get these medications is not yet available    Ask your nurse or doctor about these medications  · spironolactone 50 MG tablet         Electronically signed by Aubrey Prasad MD on 2/23/22 at 3:55 PM CST

## 2022-02-23 NOTE — CARE COORDINATION
SW placed call to insurance company of Pt:  [employer] [phone]; spoke with rep Annie Buck; to obtain an intent to transfer reference number for the Pt; as he needs to go to a higher level of care via an acute care transfer; and is on the waiting list at [facility];     he then transferred call to another dept at [phone]; told the next rep the same as above; still received no answer  Electronically signed by ANGELA Gonzalez on 2/23/2022 at 2:56 PM

## 2022-02-23 NOTE — CARE COORDINATION
SW received a call from the transfer center at [facility] [employer] in [place]; the vascular surgeon is requesting that we \"power share\" imaging with them. It is Dr. Mohit Mark at [facility]. Will notify [place].   Electronically signed by ANGELA Ogden on 2/23/2022 at 2:57 PM

## 2022-02-24 VITALS
BODY MASS INDEX: 26.16 KG/M2 | OXYGEN SATURATION: 97 % | HEART RATE: 76 BPM | HEIGHT: 67 IN | TEMPERATURE: 97 F | DIASTOLIC BLOOD PRESSURE: 73 MMHG | SYSTOLIC BLOOD PRESSURE: 135 MMHG | WEIGHT: 166.7 LBS | RESPIRATION RATE: 20 BRPM

## 2022-02-24 LAB
ALBUMIN SERPL-MCNC: 2.5 G/DL (ref 3.5–5.2)
ALP BLD-CCNC: 146 U/L (ref 40–130)
ALT SERPL-CCNC: 19 U/L (ref 5–41)
ANION GAP SERPL CALCULATED.3IONS-SCNC: 14 MMOL/L (ref 7–19)
AST SERPL-CCNC: 41 U/L (ref 5–40)
BILIRUB SERPL-MCNC: 1.3 MG/DL (ref 0.2–1.2)
BUN BLDV-MCNC: 17 MG/DL (ref 8–23)
CALCIUM SERPL-MCNC: 9.4 MG/DL (ref 8.8–10.2)
CHLORIDE BLD-SCNC: 101 MMOL/L (ref 98–111)
CO2: 22 MMOL/L (ref 22–29)
CREAT SERPL-MCNC: 0.9 MG/DL (ref 0.5–1.2)
GFR AFRICAN AMERICAN: >59
GFR NON-AFRICAN AMERICAN: >60
GLUCOSE BLD-MCNC: 122 MG/DL (ref 74–109)
HCT VFR BLD CALC: 36.3 % (ref 42–52)
HEMOGLOBIN: 12.1 G/DL (ref 14–18)
MAGNESIUM: 1.7 MG/DL (ref 1.6–2.4)
MCH RBC QN AUTO: 33.2 PG (ref 27–31)
MCHC RBC AUTO-ENTMCNC: 33.3 G/DL (ref 33–37)
MCV RBC AUTO: 99.5 FL (ref 80–94)
PDW BLD-RTO: 13.4 % (ref 11.5–14.5)
PLATELET # BLD: 131 K/UL (ref 130–400)
PMV BLD AUTO: 10.9 FL (ref 9.4–12.4)
POTASSIUM REFLEX MAGNESIUM: 3.5 MMOL/L (ref 3.5–5)
RBC # BLD: 3.65 M/UL (ref 4.7–6.1)
SODIUM BLD-SCNC: 137 MMOL/L (ref 136–145)
TOTAL PROTEIN: 7.9 G/DL (ref 6.6–8.7)
WBC # BLD: 7.6 K/UL (ref 4.8–10.8)

## 2022-02-24 PROCEDURE — 36415 COLL VENOUS BLD VENIPUNCTURE: CPT

## 2022-02-24 PROCEDURE — 83735 ASSAY OF MAGNESIUM: CPT

## 2022-02-24 PROCEDURE — 80053 COMPREHEN METABOLIC PANEL: CPT

## 2022-02-24 PROCEDURE — 6370000000 HC RX 637 (ALT 250 FOR IP): Performed by: NURSE PRACTITIONER

## 2022-02-24 PROCEDURE — 2580000003 HC RX 258

## 2022-02-24 PROCEDURE — 85027 COMPLETE CBC AUTOMATED: CPT

## 2022-02-24 PROCEDURE — 6370000000 HC RX 637 (ALT 250 FOR IP): Performed by: HOSPITALIST

## 2022-02-24 RX ORDER — BISOPROLOL FUMARATE AND HYDROCHLOROTHIAZIDE 5; 6.25 MG/1; MG/1
1 TABLET ORAL DAILY
Status: DISCONTINUED | OUTPATIENT
Start: 2022-02-24 | End: 2022-02-24 | Stop reason: CLARIF

## 2022-02-24 RX ORDER — METOPROLOL SUCCINATE 50 MG/1
50 TABLET, EXTENDED RELEASE ORAL DAILY
Status: DISCONTINUED | OUTPATIENT
Start: 2022-02-24 | End: 2022-02-25 | Stop reason: HOSPADM

## 2022-02-24 RX ADMIN — SPIRONOLACTONE 50 MG: 50 TABLET ORAL at 08:19

## 2022-02-24 RX ADMIN — METOPROLOL TARTRATE 25 MG: 25 TABLET, FILM COATED ORAL at 08:27

## 2022-02-24 RX ADMIN — SODIUM CHLORIDE, PRESERVATIVE FREE 10 ML: 5 INJECTION INTRAVENOUS at 20:19

## 2022-02-24 RX ADMIN — SPIRONOLACTONE 50 MG: 50 TABLET ORAL at 18:14

## 2022-02-24 RX ADMIN — SODIUM CHLORIDE, PRESERVATIVE FREE 5 ML: 5 INJECTION INTRAVENOUS at 17:09

## 2022-02-24 RX ADMIN — BISACODYL 10 MG: 10 SUPPOSITORY RECTAL at 08:19

## 2022-02-24 RX ADMIN — LACTULOSE 20 G: 20 SOLUTION ORAL at 08:19

## 2022-02-24 RX ADMIN — FUROSEMIDE 40 MG: 40 TABLET ORAL at 08:19

## 2022-02-24 RX ADMIN — METOPROLOL SUCCINATE 50 MG: 50 TABLET, EXTENDED RELEASE ORAL at 13:23

## 2022-02-24 RX ADMIN — PANTOPRAZOLE SODIUM 40 MG: 40 TABLET, DELAYED RELEASE ORAL at 06:52

## 2022-02-24 RX ADMIN — LACTULOSE 20 G: 20 SOLUTION ORAL at 13:23

## 2022-02-24 NOTE — PROGRESS NOTES
Transport requested via [facility].     Electronically signed by Benja Elam RN on 2/24/2022 at 3:56 PM

## 2022-02-24 NOTE — PLAN OF CARE
Problem: Skin Integrity:  Goal: Will show no infection signs and symptoms  Description: Will show no infection signs and symptoms  2/24/2022 0002 by Kayley Thakkar RN  Outcome: Ongoing  2/23/2022 1648 by Sugar Jean RN  Outcome: Ongoing  Goal: Absence of new skin breakdown  Description: Absence of new skin breakdown  2/24/2022 0002 by Kayley Thakkar RN  Outcome: Ongoing  2/23/2022 1648 by Sugar Jean RN  Outcome: Ongoing  Goal: Demonstration of wound healing without infection will improve  Description: Demonstration of wound healing without infection will improve  2/24/2022 0002 by Kayley Thakkar RN  Outcome: Ongoing  2/23/2022 1648 by Sugar Jean RN  Outcome: Ongoing  Goal: Complications related to intravenous access or infusion will be avoided or minimized  Description: Complications related to intravenous access or infusion will be avoided or minimized  2/24/2022 0002 by Kayley Thakkar RN  Outcome: Ongoing  2/23/2022 1648 by Sugar Jean RN  Outcome: Ongoing     Problem: Falls - Risk of:  Goal: Will remain free from falls  Description: Will remain free from falls  2/24/2022 0002 by Kayley Thakkar RN  Outcome: Ongoing  2/23/2022 1648 by Sugar Jean RN  Outcome: Ongoing  Goal: Absence of physical injury  Description: Absence of physical injury  2/24/2022 0002 by Kayley Thakkar RN  Outcome: Ongoing  2/23/2022 1648 by Sugar Jean RN  Outcome: Ongoing     Problem:  Activity:  Goal: Risk for activity intolerance will decrease  Description: Risk for activity intolerance will decrease  2/24/2022 0002 by Kayley Thakkar RN  Outcome: Ongoing  2/23/2022 1648 by Sugar Jean RN  Outcome: Ongoing     Problem: Coping:  Goal: Verbalizations of decreased anxiety will decrease  Description: Verbalizations of decreased anxiety will decrease  2/24/2022 0002 by Kayley Thakkar RN  Outcome: Ongoing  2/23/2022 1648 by Sugar Jean RN  Outcome: Ongoing  Goal: Family's ability to cope with current situation will improve  Description: Family's ability to cope with current situation will improve  2/24/2022 0002 by Milagros Lucero RN  Outcome: Ongoing  2/23/2022 1648 by Luigi Casper RN  Outcome: Ongoing     Problem: Health Behavior:  Goal: Ability to manage health-related needs will improve  Description: Ability to manage health-related needs will improve  2/24/2022 0002 by Milagros Lucero RN  Outcome: Ongoing  2/23/2022 1648 by Luigi Casper RN  Outcome: Ongoing  Goal: Ability to identify and utilize available support systems will improve  Description: Ability to identify and utilize available support systems will improve  2/24/2022 0002 by Milagros Lucero RN  Outcome: Ongoing  2/23/2022 1648 by Luigi Casper RN  Outcome: Ongoing     Problem: Physical Regulation:  Goal: Complications related to the disease process, condition or treatment will be avoided or minimized  Description: Complications related to the disease process, condition or treatment will be avoided or minimized  2/24/2022 0002 by Milagros Lucero RN  Outcome: Ongoing  2/23/2022 1648 by Luigi Casper RN  Outcome: Ongoing  Goal: Hemodynamic stability will improve  Description: Hemodynamic stability will improve  2/24/2022 0002 by Milagros Lucero RN  Outcome: Ongoing  2/23/2022 1648 by Luigi Casper RN  Outcome: Ongoing  Goal: Diagnostic test results will improve  Description: Diagnostic test results will improve  2/24/2022 0002 by Milagros Lucero RN  Outcome: Ongoing  2/23/2022 1648 by Luigi Casper RN  Outcome: Ongoing  Goal: Will remain free from infection  Description: Will remain free from infection  2/24/2022 0002 by Milagros Lucero RN  Outcome: Ongoing  2/23/2022 1648 by Luigi Casper RN  Outcome: Ongoing  Goal: Ability to maintain vital signs within normal range will improve  Description: Ability to maintain vital signs within normal range will improve  2/24/2022 0002 by Milagros Lucero RN  Outcome: Ongoing  2/23/2022 1648 by Luigi Casper RN  Outcome: Ongoing     Problem: Sensory:  Goal: General experience of comfort will improve  Description: General experience of comfort will improve  2/24/2022 0002 by Jhonny Callahan RN  Outcome: Ongoing  2/23/2022 1648 by Stanley Pemberton RN  Outcome: Ongoing     Problem: Discharge Planning:  Goal: Discharged to appropriate level of care  Description: Discharged to appropriate level of care  2/24/2022 0002 by Jhonny Callahan RN  Outcome: Ongoing  2/23/2022 1648 by Stanley Pemberton RN  Outcome: Ongoing     Problem: Fluid Volume - Deficit:  Goal: Absence of fluid volume deficit signs and symptoms  Description: Absence of fluid volume deficit signs and symptoms  2/24/2022 0002 by Jhonny Callahan RN  Outcome: Ongoing  2/23/2022 1648 by Stanley Pemberton RN  Outcome: Ongoing     Problem: Nutrition Deficit:  Goal: Ability to achieve adequate nutritional intake will improve  Description: Ability to achieve adequate nutritional intake will improve  2/24/2022 0002 by Jhonny Callahan RN  Outcome: Ongoing  2/23/2022 1648 by Stanley Pemberton RN  Outcome: Ongoing     Problem: Sleep Pattern Disturbance:  Goal: Appears well-rested  Description: Appears well-rested  2/24/2022 0002 by Jhonny Callahan RN  Outcome: Ongoing  2/23/2022 1648 by Stanley Pemberton RN  Outcome: Ongoing     Problem: Violence - Risk of, Self/Other-Directed:  Goal: Knowledge of developmental care interventions  Description: Absence of violence  2/24/2022 0002 by Jhonny Callahan RN  Outcome: Ongoing  2/23/2022 1648 by Stanley Pemberton RN  Outcome: Ongoing     Problem: Nutritional:  Goal: Nutritional status will improve  Description: Nutritional status will improve  2/24/2022 0002 by Jhonny Callahan RN  Outcome: Ongoing  2/23/2022 1648 by Stanley Pemberton RN  Outcome: Ongoing  Goal: Ability to follow a diet with enough fiber (20 to 30 grams) for normal bowel function will improve  Description: Ability to follow a diet with enough fiber (20 to 30 grams) for normal bowel function will improve  2/24/2022 0002 by Kayley Thakkar RN  Outcome: Ongoing  2/23/2022 1648 by Sugar Jean RN  Outcome: Ongoing     Problem: Respiratory:  Goal: Ability to maintain normal respiratory secretions will improve  Description: Ability to maintain normal respiratory secretions will improve  2/24/2022 0002 by Kayley Thakkar RN  Outcome: Ongoing  2/23/2022 1648 by Sugar Jean RN  Outcome: Ongoing     Problem: Nausea/Vomiting:  Goal: Ability to achieve adequate nutritional intake will improve  Description: Ability to achieve adequate nutritional intake will improve  2/23/2022 1648 by Sugar Jean RN  Outcome: Ongoing     Problem: Nausea/Vomiting:  Goal: Ability to achieve adequate nutritional intake will improve  Description: Ability to achieve adequate nutritional intake will improve  2/24/2022 0002 by Kayley Thakkar RN  Outcome: Ongoing  2/23/2022 1648 by Sugar Jean RN  Outcome: Ongoing  Goal: Absence of nausea/vomiting  Description: Absence of nausea/vomiting  2/24/2022 0002 by Kayley Thakkar RN  Outcome: Ongoing  2/23/2022 1648 by Sugar Jean RN  Outcome: Ongoing  Goal: Able to drink  Description: Able to drink  2/24/2022 0002 by Kayley Thakkar RN  Outcome: Ongoing  2/23/2022 1648 by Sugar Jean RN  Outcome: Ongoing  Goal: Able to eat  Description: Able to eat  2/24/2022 0002 by Kayley Thakkar RN  Outcome: Ongoing  2/23/2022 1648 by Sugar Jean RN  Outcome: Ongoing     Problem:  Bowel/Gastric:  Goal: Ability to achieve a regular elimination pattern will improve  Description: Ability to achieve a regular elimination pattern will improve  2/24/2022 0002 by Kayley Thakkar RN  Outcome: Ongoing  2/23/2022 1648 by Sugar Jean RN  Outcome: Ongoing  Goal: Occurrences of constipation will decrease  Description: Occurrences of constipation will decrease  2/24/2022 0002 by Kayley Thakkar RN  Outcome: Ongoing  2/23/2022 1648 by Sugar Jean RN  Outcome: Ongoing

## 2022-02-24 NOTE — PROGRESS NOTES
Progress Note  Date:2022       Room:0324/324-01  Patient Tu Toney     YOB: 1960     Age:61 y.o. Subjective    Subjective: 49-year-old male with a history of alcohol abuse with related cirrhosis of the liver, presented to the hospital  with concerns of abdominal pain. Patient has been having abdominal pain for the past 2 months, was seen outpatient by GI, EGD and colonoscopy completed per patient's wife and noted to have 1 small polyp which was resected. Stated there was no other acute concerns. During outpatient work-up for abdominal pain was noted to have 5.9 cm abdominal aneurysm, was referred to vascular surgeon in [address] [place] [address] and sent for CT scan. Due to increasing and persistent abdominal pain, PCP referred patient to the emergency room for further evaluation. Work-up in the ER including CTA abdomen showed fusiform aneurysmal dilatation of the distal abdominal aorta 6.7 x 6.2 cm. 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 Moderate abdominal ascites was noted. 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 It is not suitable for  EVAR since there is no infrarenal  neck. He is not a good candidate for open repair. Recommended transfer to tertiary center. Patient's case was discussed with [facility] and accepted for transfer by .      Informed nurse that all imaging needs to be placed in the disc to accompany patient on transfer. Awaiting bed availability.        Review of Systems: 12 point system review negative suggested above.       Objective         Vitals Last 24 Hours:  TEMPERATURE:  Temp  Av.7 °F (36.5 °C)  Min: 96.8 °F (36 °C)  Max: 98.9 °F (37.2 °C)  RESPIRATIONS RANGE: Resp  Av.5  Min: 12  Max: 22  PULSE OXIMETRY RANGE: SpO2  Av.8 %  Min: 97 %  Max: 98 %  PULSE RANGE: Pulse  Av.5  Min: 78  Max: 97  BLOOD PRESSURE RANGE: Systolic (50HZA), ERICA:353 , Min:138 , MIS:830   ; Diastolic (85EDZ), ULU:42, Min:72, Max:81    I/O (24Hr): Intake/Output Summary (Last 24 hours) at 2022 1501  Last data filed at 2022 7402  Gross per 24 hour   Intake 360 ml   Output 200 ml   Net 160 ml          Physical Examination:  General: Jaundiced, well-developed, no acute distress lying comfortably in bed. HEENT: Atraumatic normocephalic, range of motion normal, no JVD, no tracheal deviation noted. Cardiac: Normal S1-S2 no murmurs rub or gallop. Respiratory: clear To auscultation bilaterally, no rhonchi or rales, no wheezing  Abdomen: Soft, positive bowel sounds in all quadrants, no distention, mild tenderness to deep palpation in lower epigastric region. Extremities: no tenderness, no edema, moves all extremities  Psych: Affect normal and good eye contact, behavioral normal.         Labs/Imaging/Diagnostics    Labs:  CBC:  Recent Labs     22  024   WBC 6.2 6.0 7.6   RBC 3.71* 3.58* 3.65*   HGB 12.6* 11.9* 12.1*   HCT 37.8* 35.2* 36.3*   .9* 98.3* 99.5*   RDW 13.7 13.4 13.4   * 122* 131     CHEMISTRIES:  Recent Labs     22  0250 22  024    136 137   K 4.1 3.8 3.5    101 101   CO2    BUN 15 20 17   CREATININE 0.8 1.0 0.9   GLUCOSE 99 138* 122*   MG  --   --  1.7     PT/INR:No results for input(s): PROTIME, INR in the last 72 hours. APTT:No results for input(s): APTT in the last 72 hours. LIVER PROFILE:  Recent Labs     22  0250 22  0241   AST 37 35 41*   ALT 19 17 19   BILITOT 0.9 1.0 1.3*   ALKPHOS 150* 145* 146*       Imaging Last 24 Hours:  No results found.   Assessment//Plan           Hospital Problems           Last Modified POA    * (Principal) Aneurysm ([facility] [address]  Yes    Alcoholic cirrhosis of liver with ascites ([facility] [address] 2/22/2022 Yes    Ascites due to alcoholic cirrhosis ([facility] [address] 0/92/5777 Yes        Assessment & Plan      Abdominal pain with noted distal AAA 6.7 x 6.2 cm  Evaluated by vascular surgery, recommend transfer to tertiary center as patient is not suitable for [facility] since there is no infrarenal  neck. Discussed with [facility] patient accepted and awaiting better with ability. Abdominal pain currently stable    Cirrhosis of the liver secondary to alcohol abuse  Ascites  Patient has refrain from alcohol in the last 2 months. Ascites improved with diuretics. Continue spironolactone, initiated on Lasix  Continue PPI. Constipation: Initiated on lactulose  Continue suppository. Hypertension: Continue medication as currently ordered. Code: Full  Disposition: Hopeful for bed availability for transfer to tertiary center. Electronically signed by   Briana Garduno MD   Internal Medicine Hospitalist  On 2/24/2022  At 3:09 PM    EMR Dragon/Transcription disclaimer:   Much of this encounter note is an electronic transcription/translation of spoken language to printed text.  The electronic translation of spoken language may permit erroneous, or at times, nonsensical words or phrases to be inadvertently transcribed; although attempts have made to review the note for such errors, some may still exist.

## 2022-02-24 NOTE — DISCHARGE SUMMARY
Discharge Summary      Date:2/24/2022        Patient Almita Griffin     YOB: 1960     Age:61 y.o. Admit Date:2/21/2022   Admission Condition:fair   Discharged Condition:fair  Discharge Date: 02/24/22       Discharge Diagnoses   Principal Problem:    Aneurysm [facility])  Active Problems:    Alcoholic cirrhosis of liver with ascites ([facility] [address]    Ascites due to alcoholic cirrhosis ([facility] [address]  Resolved Problems:    * No resolved hospital problems. [facility] Stay   Narrative of Hospital Course:     51-year-old male with a history of alcohol abuse with related cirrhosis of the liver, presented to the hospital 2/21 with concerns of abdominal pain. Patient has been having abdominal pain for the past 2 months, was seen outpatient by GI, EGD and colonoscopy completed per patient's wife and noted to have 1 small polyp which was resected. Stated there was no other acute concerns. During outpatient work-up for abdominal pain was noted to have 5.9 cm abdominal aneurysm, was referred to vascular surgeon in [address] [place] [address] and sent for CT scan. Due to increasing and persistent abdominal pain, PCP referred patient to the emergency room for further evaluation. Work-up in the ER including CTA abdomen showed fusiform aneurysmal dilatation of the distal abdominal aorta 6.7 x 6.2 cm. 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 Moderate abdominal ascites was noted. 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 It is not suitable for  [facility] since there is no infrarenal  neck. He is not a good candidate for open repair. Recommended transfer to tertiary center. Patient's case was discussed with [facility] and accepted for transfer by .      Informed nurse that all imaging needs to be placed in the disc to accompany patient on transfer. Physical Examination:  General: Jaundiced, well-developed, no acute distress lying comfortably in bed. HEENT: Atraumatic normocephalic, range of motion normal, no JVD, no tracheal deviation noted. Cardiac: Normal S1-S2 no murmurs rub or gallop. Respiratory: clear To auscultation bilaterally, no rhonchi or rales, no wheezing  Abdomen: Soft, positive bowel sounds in all quadrants, no distention, mild tenderness to deep palpation in lower epigastric region. Extremities: no tenderness, no edema, moves all extremities  Psych: Affect normal and good eye contact, behavioral normal.        Consultants:   IP CONSULT TO VASCULAR SURGERY  IP CONSULT TO SOCIAL WORK  IP CONSULT TO GI    Time Spent on Discharge:  35 minutes were spent in patient examination, evaluation, counseling as well as medication reconciliation, prescriptions for required medications, discharge plan and follow up. Surgeries/Procedures Performed:  NONE      Significant Diagnostic Studies:   Recent Labs:    CBC:   Lab Results   Component Value Date    WBC 7.6 02/24/2022    RBC 3.65 02/24/2022    HGB 12.1 02/24/2022    HCT 36.3 02/24/2022    MCV 99.5 02/24/2022    MCH 33.2 02/24/2022    MCHC 33.3 02/24/2022    RDW 13.4 02/24/2022     02/24/2022     BMP:    Lab Results   Component Value Date    GLUCOSE 122 02/24/2022     02/24/2022    K 3.5 02/24/2022     02/24/2022    CO2 22 02/24/2022    ANIONGAP 14 02/24/2022    BUN 17 02/24/2022    CREATININE 0.9 02/24/2022    CALCIUM 9.4 02/24/2022    LABGLOM >60 02/24/2022    GFRAA >59 02/24/2022       Radiology Last 7 Days:  CTA ABDOMINAL AORTA W BILAT RUNOFF W CONTRAST    Result Date: 2/21/2022  1. The severe atheromatous changes of the abdominal aorta and iliac and femoral arteries. 2. Fusiform aneurysmal dilatation of the distal abdominal aorta which measure 6.7 x 6.2 cm. Partial lumen mural thrombosis. The details is given above.  3. Complete occlusion of the right proximal common iliac artery. The re-opacification of the distal right common iliac artery adjacent and above the bifurcation from the inferior epigastric and circumflex iliac collateral circulation. 4. There are small and attenuated three-vessel runoff bilaterally. 5. Moderate abdominal and pelvic ascites. 6. Hepatic cirrhosis a moderate splenomegaly. 7. A mild mesenteric adenopathy. The above findings were reported to ER physician, Dr. Maty Mcfarlane, immediately. Signed by Dr Kristie Coppola      Discharge Plan   Disposition: Discharge/Readmit    Provider Follow-Up:   No follow-up provider specified. Hospital/Incidental Findings Requiring Follow-Up:  Fusiform aneurysmal dilatation of the distal abdominal aorta which measure 6.7 x 6.2 cm      Patient Instructions   Diet: cardiac diet    Activity: activity as tolerated      Discharge Medications         Medication List      START taking these medications    furosemide 40 MG tablet  Commonly known as: LASIX  Take 1 tablet by mouth daily     lactulose 10 GM/15ML solution  Commonly known as: CHRONULAC  Take 30 mLs by mouth 2 times daily        CHANGE how you take these medications    spironolactone 50 MG tablet  Commonly known as: ALDACTONE  Take 1 tablet by mouth 2 times daily For 30 days. Filled 2/11/22.   What changed:   · medication strength  · how much to take        CONTINUE taking these medications    bisoprolol-hydroCHLOROthiazide 5-6.25 MG per tablet  Commonly known as: ZIAC     hydrOXYzine 50 MG tablet  Commonly known as: ATARAX     omeprazole 40 MG delayed release capsule  Commonly known as: PRILOSEC           Where to Get Your Medications      These medications were sent to [id number], [address] [place] [address], [address] [place] [address]    Phone: [phone]   · furosemide 40 MG tablet  · lactulose 10 GM/15ML solution     Information about where to get these medications is not yet available    Ask your nurse or doctor about these medications  · spironolactone 50 MG tablet         Electronically signed by   Shyam Victor MD   Internal Medicine Hospitalist  On 2/24/2022  At 3:15 PM    EMR Dragon/Transcription disclaimer:   Much of this encounter note is an electronic transcription/translation of spoken language to printed text.  The electronic translation of spoken language may permit erroneous, or at times, nonsensical words or phrases to be inadvertently transcribed; although attempts have made to review the note for such errors, some may still exist.

## 2022-02-24 NOTE — PROGRESS NOTES
Patient unable to urinate at this time, aware a sample is needed.   Report called to [facility]. Floor number [phone]. Patient accepted to [address].     Electronically signed by Benja Elam RN on 2/24/2022 at 4:10 PM